# Patient Record
Sex: FEMALE | Race: WHITE | NOT HISPANIC OR LATINO | Employment: OTHER | URBAN - NONMETROPOLITAN AREA
[De-identification: names, ages, dates, MRNs, and addresses within clinical notes are randomized per-mention and may not be internally consistent; named-entity substitution may affect disease eponyms.]

---

## 2017-02-14 ENCOUNTER — APPOINTMENT (OUTPATIENT)
Dept: PHYSICAL THERAPY | Facility: CLINIC | Age: 67
End: 2017-02-14
Payer: MEDICARE

## 2017-02-14 PROCEDURE — G8988 SELF CARE GOAL STATUS: HCPCS

## 2017-02-14 PROCEDURE — G8987 SELF CARE CURRENT STATUS: HCPCS

## 2017-02-14 PROCEDURE — 97014 ELECTRIC STIMULATION THERAPY: CPT

## 2017-02-14 PROCEDURE — 97035 APP MDLTY 1+ULTRASOUND EA 15: CPT

## 2017-02-14 PROCEDURE — 97140 MANUAL THERAPY 1/> REGIONS: CPT

## 2017-02-14 PROCEDURE — 97760 ORTHOTIC MGMT&TRAING 1ST ENC: CPT

## 2017-02-14 PROCEDURE — 97162 PT EVAL MOD COMPLEX 30 MIN: CPT

## 2017-02-15 ENCOUNTER — APPOINTMENT (OUTPATIENT)
Dept: PHYSICAL THERAPY | Facility: CLINIC | Age: 67
End: 2017-02-15
Payer: MEDICARE

## 2017-02-16 ENCOUNTER — APPOINTMENT (OUTPATIENT)
Dept: PHYSICAL THERAPY | Facility: CLINIC | Age: 67
End: 2017-02-16
Payer: MEDICARE

## 2017-02-16 PROCEDURE — 97035 APP MDLTY 1+ULTRASOUND EA 15: CPT

## 2017-02-16 PROCEDURE — 97140 MANUAL THERAPY 1/> REGIONS: CPT

## 2017-02-16 PROCEDURE — 97014 ELECTRIC STIMULATION THERAPY: CPT

## 2017-02-16 PROCEDURE — 97110 THERAPEUTIC EXERCISES: CPT

## 2017-02-16 PROCEDURE — 97760 ORTHOTIC MGMT&TRAING 1ST ENC: CPT

## 2017-02-20 ENCOUNTER — APPOINTMENT (OUTPATIENT)
Dept: PHYSICAL THERAPY | Facility: CLINIC | Age: 67
End: 2017-02-20
Payer: MEDICARE

## 2017-02-20 PROCEDURE — 97140 MANUAL THERAPY 1/> REGIONS: CPT

## 2017-02-20 PROCEDURE — 97014 ELECTRIC STIMULATION THERAPY: CPT

## 2017-02-20 PROCEDURE — 97035 APP MDLTY 1+ULTRASOUND EA 15: CPT

## 2017-02-20 PROCEDURE — 97110 THERAPEUTIC EXERCISES: CPT

## 2017-02-21 ENCOUNTER — APPOINTMENT (OUTPATIENT)
Dept: PHYSICAL THERAPY | Facility: CLINIC | Age: 67
End: 2017-02-21
Payer: MEDICARE

## 2017-02-24 ENCOUNTER — APPOINTMENT (OUTPATIENT)
Dept: PHYSICAL THERAPY | Facility: CLINIC | Age: 67
End: 2017-02-24
Payer: MEDICARE

## 2017-02-24 PROCEDURE — 97110 THERAPEUTIC EXERCISES: CPT

## 2017-02-24 PROCEDURE — 97014 ELECTRIC STIMULATION THERAPY: CPT

## 2017-02-24 PROCEDURE — 97140 MANUAL THERAPY 1/> REGIONS: CPT

## 2017-02-24 PROCEDURE — 97035 APP MDLTY 1+ULTRASOUND EA 15: CPT

## 2017-02-27 ENCOUNTER — APPOINTMENT (OUTPATIENT)
Dept: PHYSICAL THERAPY | Facility: CLINIC | Age: 67
End: 2017-02-27
Payer: MEDICARE

## 2017-02-27 PROCEDURE — 97110 THERAPEUTIC EXERCISES: CPT

## 2017-02-27 PROCEDURE — 97140 MANUAL THERAPY 1/> REGIONS: CPT

## 2017-02-27 PROCEDURE — 97014 ELECTRIC STIMULATION THERAPY: CPT

## 2017-02-27 PROCEDURE — 97035 APP MDLTY 1+ULTRASOUND EA 15: CPT

## 2017-03-02 ENCOUNTER — APPOINTMENT (OUTPATIENT)
Dept: PHYSICAL THERAPY | Facility: CLINIC | Age: 67
End: 2017-03-02
Payer: MEDICARE

## 2017-03-02 PROCEDURE — 97110 THERAPEUTIC EXERCISES: CPT

## 2017-03-02 PROCEDURE — 97035 APP MDLTY 1+ULTRASOUND EA 15: CPT

## 2017-03-02 PROCEDURE — 97140 MANUAL THERAPY 1/> REGIONS: CPT

## 2017-03-02 PROCEDURE — 97014 ELECTRIC STIMULATION THERAPY: CPT

## 2017-03-06 ENCOUNTER — APPOINTMENT (OUTPATIENT)
Dept: PHYSICAL THERAPY | Facility: CLINIC | Age: 67
End: 2017-03-06
Payer: MEDICARE

## 2017-03-06 PROCEDURE — 97035 APP MDLTY 1+ULTRASOUND EA 15: CPT

## 2017-03-06 PROCEDURE — 97110 THERAPEUTIC EXERCISES: CPT

## 2017-03-06 PROCEDURE — 97140 MANUAL THERAPY 1/> REGIONS: CPT

## 2017-03-06 PROCEDURE — 97014 ELECTRIC STIMULATION THERAPY: CPT

## 2017-03-09 ENCOUNTER — APPOINTMENT (OUTPATIENT)
Dept: PHYSICAL THERAPY | Facility: CLINIC | Age: 67
End: 2017-03-09
Payer: MEDICARE

## 2017-03-09 PROCEDURE — 97014 ELECTRIC STIMULATION THERAPY: CPT

## 2017-03-09 PROCEDURE — 97140 MANUAL THERAPY 1/> REGIONS: CPT

## 2017-03-09 PROCEDURE — 97035 APP MDLTY 1+ULTRASOUND EA 15: CPT

## 2017-03-09 PROCEDURE — 97110 THERAPEUTIC EXERCISES: CPT

## 2017-03-13 ENCOUNTER — APPOINTMENT (OUTPATIENT)
Dept: PHYSICAL THERAPY | Facility: CLINIC | Age: 67
End: 2017-03-13
Payer: MEDICARE

## 2017-03-13 PROCEDURE — 97035 APP MDLTY 1+ULTRASOUND EA 15: CPT

## 2017-03-13 PROCEDURE — 97014 ELECTRIC STIMULATION THERAPY: CPT

## 2017-03-13 PROCEDURE — 97140 MANUAL THERAPY 1/> REGIONS: CPT

## 2017-03-13 PROCEDURE — 97110 THERAPEUTIC EXERCISES: CPT

## 2017-03-16 ENCOUNTER — APPOINTMENT (OUTPATIENT)
Dept: PHYSICAL THERAPY | Facility: CLINIC | Age: 67
End: 2017-03-16
Payer: MEDICARE

## 2017-03-16 PROCEDURE — G8987 SELF CARE CURRENT STATUS: HCPCS

## 2017-03-16 PROCEDURE — 97140 MANUAL THERAPY 1/> REGIONS: CPT

## 2017-03-16 PROCEDURE — G8988 SELF CARE GOAL STATUS: HCPCS

## 2017-03-16 PROCEDURE — 97110 THERAPEUTIC EXERCISES: CPT

## 2017-03-16 PROCEDURE — 97035 APP MDLTY 1+ULTRASOUND EA 15: CPT

## 2017-03-16 PROCEDURE — 97014 ELECTRIC STIMULATION THERAPY: CPT

## 2017-03-20 ENCOUNTER — APPOINTMENT (OUTPATIENT)
Dept: PHYSICAL THERAPY | Facility: CLINIC | Age: 67
End: 2017-03-20
Payer: MEDICARE

## 2017-03-20 PROCEDURE — 97140 MANUAL THERAPY 1/> REGIONS: CPT

## 2017-03-20 PROCEDURE — 97014 ELECTRIC STIMULATION THERAPY: CPT

## 2017-03-20 PROCEDURE — 97035 APP MDLTY 1+ULTRASOUND EA 15: CPT

## 2017-03-20 PROCEDURE — 97110 THERAPEUTIC EXERCISES: CPT

## 2017-03-23 ENCOUNTER — APPOINTMENT (OUTPATIENT)
Dept: PHYSICAL THERAPY | Facility: CLINIC | Age: 67
End: 2017-03-23
Payer: MEDICARE

## 2017-03-23 PROCEDURE — 97110 THERAPEUTIC EXERCISES: CPT

## 2017-03-23 PROCEDURE — 97035 APP MDLTY 1+ULTRASOUND EA 15: CPT

## 2017-03-23 PROCEDURE — 97014 ELECTRIC STIMULATION THERAPY: CPT

## 2017-03-23 PROCEDURE — 97140 MANUAL THERAPY 1/> REGIONS: CPT

## 2017-03-27 ENCOUNTER — APPOINTMENT (OUTPATIENT)
Dept: PHYSICAL THERAPY | Facility: CLINIC | Age: 67
End: 2017-03-27
Payer: MEDICARE

## 2017-03-27 PROCEDURE — 97035 APP MDLTY 1+ULTRASOUND EA 15: CPT

## 2017-03-27 PROCEDURE — 97140 MANUAL THERAPY 1/> REGIONS: CPT

## 2017-03-27 PROCEDURE — 97014 ELECTRIC STIMULATION THERAPY: CPT

## 2017-03-27 PROCEDURE — 97110 THERAPEUTIC EXERCISES: CPT

## 2017-03-30 ENCOUNTER — APPOINTMENT (OUTPATIENT)
Dept: PHYSICAL THERAPY | Facility: CLINIC | Age: 67
End: 2017-03-30
Payer: MEDICARE

## 2017-03-30 PROCEDURE — 97140 MANUAL THERAPY 1/> REGIONS: CPT

## 2017-03-30 PROCEDURE — 97035 APP MDLTY 1+ULTRASOUND EA 15: CPT

## 2017-03-30 PROCEDURE — 97110 THERAPEUTIC EXERCISES: CPT

## 2017-03-30 PROCEDURE — 97014 ELECTRIC STIMULATION THERAPY: CPT

## 2017-04-03 ENCOUNTER — APPOINTMENT (OUTPATIENT)
Dept: PHYSICAL THERAPY | Facility: CLINIC | Age: 67
End: 2017-04-03
Payer: MEDICARE

## 2017-04-03 PROCEDURE — 97035 APP MDLTY 1+ULTRASOUND EA 15: CPT

## 2017-04-03 PROCEDURE — 97014 ELECTRIC STIMULATION THERAPY: CPT

## 2017-04-03 PROCEDURE — 97140 MANUAL THERAPY 1/> REGIONS: CPT

## 2017-04-03 PROCEDURE — 97110 THERAPEUTIC EXERCISES: CPT

## 2017-04-06 ENCOUNTER — APPOINTMENT (OUTPATIENT)
Dept: PHYSICAL THERAPY | Facility: CLINIC | Age: 67
End: 2017-04-06
Payer: MEDICARE

## 2017-04-06 PROCEDURE — 97035 APP MDLTY 1+ULTRASOUND EA 15: CPT

## 2017-04-06 PROCEDURE — 97110 THERAPEUTIC EXERCISES: CPT

## 2017-04-06 PROCEDURE — 97014 ELECTRIC STIMULATION THERAPY: CPT

## 2017-04-06 PROCEDURE — 97140 MANUAL THERAPY 1/> REGIONS: CPT

## 2017-04-10 ENCOUNTER — APPOINTMENT (OUTPATIENT)
Dept: PHYSICAL THERAPY | Facility: CLINIC | Age: 67
End: 2017-04-10
Payer: MEDICARE

## 2017-04-10 PROCEDURE — 97140 MANUAL THERAPY 1/> REGIONS: CPT

## 2017-04-10 PROCEDURE — 97035 APP MDLTY 1+ULTRASOUND EA 15: CPT

## 2017-04-10 PROCEDURE — 97014 ELECTRIC STIMULATION THERAPY: CPT

## 2017-04-10 PROCEDURE — 97110 THERAPEUTIC EXERCISES: CPT

## 2017-04-13 ENCOUNTER — APPOINTMENT (OUTPATIENT)
Dept: PHYSICAL THERAPY | Facility: CLINIC | Age: 67
End: 2017-04-13
Payer: MEDICARE

## 2017-04-13 PROCEDURE — 97035 APP MDLTY 1+ULTRASOUND EA 15: CPT

## 2017-04-13 PROCEDURE — 97014 ELECTRIC STIMULATION THERAPY: CPT

## 2017-04-13 PROCEDURE — 97140 MANUAL THERAPY 1/> REGIONS: CPT

## 2017-04-13 PROCEDURE — 97110 THERAPEUTIC EXERCISES: CPT

## 2017-04-17 ENCOUNTER — APPOINTMENT (OUTPATIENT)
Dept: PHYSICAL THERAPY | Facility: CLINIC | Age: 67
End: 2017-04-17
Payer: MEDICARE

## 2017-04-17 PROCEDURE — 97110 THERAPEUTIC EXERCISES: CPT

## 2017-04-17 PROCEDURE — 97140 MANUAL THERAPY 1/> REGIONS: CPT

## 2017-04-17 PROCEDURE — 97014 ELECTRIC STIMULATION THERAPY: CPT

## 2017-04-17 PROCEDURE — 97035 APP MDLTY 1+ULTRASOUND EA 15: CPT

## 2017-04-20 ENCOUNTER — APPOINTMENT (OUTPATIENT)
Dept: PHYSICAL THERAPY | Facility: CLINIC | Age: 67
End: 2017-04-20
Payer: MEDICARE

## 2017-04-20 PROCEDURE — 97014 ELECTRIC STIMULATION THERAPY: CPT

## 2017-04-20 PROCEDURE — 97110 THERAPEUTIC EXERCISES: CPT

## 2017-04-20 PROCEDURE — 97140 MANUAL THERAPY 1/> REGIONS: CPT

## 2017-04-20 PROCEDURE — 97035 APP MDLTY 1+ULTRASOUND EA 15: CPT

## 2017-04-24 ENCOUNTER — APPOINTMENT (OUTPATIENT)
Dept: PHYSICAL THERAPY | Facility: CLINIC | Age: 67
End: 2017-04-24
Payer: MEDICARE

## 2017-04-24 PROCEDURE — 97014 ELECTRIC STIMULATION THERAPY: CPT

## 2017-04-24 PROCEDURE — 97035 APP MDLTY 1+ULTRASOUND EA 15: CPT

## 2017-04-24 PROCEDURE — 97140 MANUAL THERAPY 1/> REGIONS: CPT

## 2017-04-24 PROCEDURE — 97110 THERAPEUTIC EXERCISES: CPT

## 2017-04-27 ENCOUNTER — APPOINTMENT (OUTPATIENT)
Dept: PHYSICAL THERAPY | Facility: CLINIC | Age: 67
End: 2017-04-27
Payer: MEDICARE

## 2017-04-28 ENCOUNTER — APPOINTMENT (OUTPATIENT)
Dept: PHYSICAL THERAPY | Facility: CLINIC | Age: 67
End: 2017-04-28
Payer: MEDICARE

## 2017-04-28 PROCEDURE — 97140 MANUAL THERAPY 1/> REGIONS: CPT

## 2017-04-28 PROCEDURE — G8988 SELF CARE GOAL STATUS: HCPCS

## 2017-04-28 PROCEDURE — G8989 SELF CARE D/C STATUS: HCPCS

## 2017-04-28 PROCEDURE — 97014 ELECTRIC STIMULATION THERAPY: CPT

## 2017-04-28 PROCEDURE — 97035 APP MDLTY 1+ULTRASOUND EA 15: CPT

## 2017-04-28 PROCEDURE — 97110 THERAPEUTIC EXERCISES: CPT

## 2019-10-23 ENCOUNTER — EVALUATION (OUTPATIENT)
Dept: PHYSICAL THERAPY | Facility: CLINIC | Age: 69
End: 2019-10-23
Payer: MEDICARE

## 2019-10-23 ENCOUNTER — TRANSCRIBE ORDERS (OUTPATIENT)
Dept: PHYSICAL THERAPY | Facility: CLINIC | Age: 69
End: 2019-10-23

## 2019-10-23 DIAGNOSIS — M25.511 ACUTE PAIN OF RIGHT SHOULDER: Primary | ICD-10-CM

## 2019-10-23 PROCEDURE — 97162 PT EVAL MOD COMPLEX 30 MIN: CPT | Performed by: PHYSICAL THERAPIST

## 2019-10-23 PROCEDURE — 97535 SELF CARE MNGMENT TRAINING: CPT | Performed by: PHYSICAL THERAPIST

## 2019-10-23 NOTE — LETTER
2019    Martha Castillo PA-C  [address]    Patient: Alejandro Weinstein   YOB: 1950   Date of Visit: 10/23/2019     Encounter Diagnosis     ICD-10-CM    1  Acute pain of right shoulder M25 511        Dear Dr Jaz Bal: Thank you for your recent referral of Alejandro Weinstein  Please review the attached evaluation summary from Annalee's recent visit  Please verify that you agree with the plan of care by signing the attached order  If you have any questions or concerns, please do not hesitate to call  I sincerely appreciate the opportunity to share in the care of one of your patients and hope to have another opportunity to work with you in the near future  Sincerely,    Tony Richardson, PT      Referring Provider:      I certify that I have read the below Plan of Care and certify the need for these services furnished under this plan of treatment while under my care  Martha Castillo PA-C  [id number]  [address] [place] [address]  [address]: [phone]          PT Evaluation     Today's date: 10/23/2019  Patient name: Alejandro Weinstein  : 1950  MRN: [id number]  Referring provider: Bryce Alaniz PA-C  Dx:   Encounter Diagnosis     ICD-10-CM    1  Acute pain of right shoulder M25 511        Start Time: 1030  Stop Time: 1130  Total time in clinic (min): 60 minutes    Assessment  Assessment details: Pt presents with c/c of right shoulder pain and dysfunction  PT notes the patient with s/s consistent with RTC and biceps impairment, likely partial tear, leading to decreased mm strength t/o the right shoulder, decreased active and passive ROM, and increased pain with RUE movement  Pt would benefit from course of skilled therapy to improve strength and mobility of the right shoulder, improve scapular stabilization, decrease pain, and restore functional mobility of the RUE   Prognosis is good with adherence to skilled PT 2-3x/wk and compliance to HEP  Based upon examination, no other referral appears necessary at this time  Impairments: abnormal or restricted ROM, activity intolerance, impaired physical strength, lacks appropriate home exercise program, pain with function, scapular dyskinesis and poor posture   Understanding of Dx/Px/POC: good   Prognosis: good    Goals  Short Term Goals  1  Pt will decrease pain in right shoulder 25-50% in 4 wks  2  Pt will improve right shoulder ROM to [facility] in 4  wks  3  Initiate HEP     Long Term Goals  1  Pt will decrease pain in right shoulder % in 8 wks  2  Pt will improve right shoulder mm strength to 5/5 in 8 wks  3  Pt will report no limitations with ADLs in 8 wks     Plan  Plan details: Discussed findings of evaluation with the patient, patient agreeable to skilled PT 2-3x/wk for 4 wks  POC and treatment goals discussed with PT/PTA  Patient would benefit from: PT eval and skilled physical therapy  Referral necessary: No  Planned modality interventions: cryotherapy, unattended electrical stimulation and thermotherapy: hydrocollator packs  Planned therapy interventions: abdominal trunk stabilization, flexibility, functional ROM exercises, graded exercise, home exercise program, joint mobilization, manual therapy, massage, neuromuscular re-education, patient education, postural training, strengthening, stretching and therapeutic exercise  Frequency: 3x week  Duration in weeks: 8  Plan of Care beginning date: 10/23/2019  Plan of Care expiration date: 11/22/2019  Treatment plan discussed with: patient and PTA        Subjective Evaluation    History of Present Illness  Mechanism of injury: Pt presents with c/c of right shoulder pain  Reports symptoms began in August with insidious onset, noting pain when lying on her right side when sleeping  Pt reports in September she was lifting a box and heard a "pop" in her upper arm, and pain has been more constant since then   Pt went to see MD, had injection, and was referred to OPPT  Had x-ray which was negative for osseous injury  No other treatment or imaging to date, no previous injury per pt  Scheduled to f/u with MD on   Pain  Current pain ratin  At best pain ratin  At worst pain ratin  Location: Right Shoulder   Quality: dull ache and sharp  Relieving factors: heat  Aggravating factors: lifting and overhead activity  Progression: no change    Hand dominance: right      Diagnostic Tests  X-ray: abnormal  Treatments  Previous treatment: injection treatment  Current treatment: physical therapy  Patient Goals  Patient goals for therapy: decreased pain, increased motion, increased strength, independence with ADLs/IADLs and return to sport/leisure activities          Objective     Palpation     Right   Tenderness of the anterior deltoid, biceps, teres major and teres minor  Tenderness     Right Shoulder  Tenderness in the acromion, biceps tendon (proximal) and bicipital groove  Cervical/Thoracic Screen   Cervical range of motion within normal limits    Neurological Testing     Sensation     Shoulder   Left Shoulder   Intact: light touch    Right Shoulder   Intact: light touch    Active Range of Motion   Left Shoulder   External rotation BTH: C7   Internal rotation BTB: mid thoracic     Right Shoulder   Flexion: 135 degrees   Extension: WFL  Abduction: 110 degrees with pain  External rotation 45°:  40 degrees with pain  External rotation BTH: C3   Internal rotation 45°:  55 degrees   Internal rotation BTB: sacrum with pain    Strength/Myotome Testing     Left Shoulder   Normal muscle strength    Right Shoulder     Planes of Motion   Flexion: 4   Extension: 4+   Abduction: 3+   Adduction: 4-   External rotation at 0°:  3+   Internal rotation at 0°:  4-     Isolated Muscles   Biceps: 4   Triceps: 4+   Upper trapezius: 4+     Tests     Right Shoulder   Positive empty can, lift-off, painful arc, Speed's, Yergason's and bicep load   Negative apprehension, clunk, drop arm, external rotation lag sign, full can, internal rotation lag sign, sulcus sign and posterior apprehension        Additional Tests Details  (+) RTC and biceps impairment of right shoulder, likely partial tear              Precautions: Right Shoulder Pain (Possible RTC/biceps partial tear)      Manual  10/23       Right Shoulder Streching & PROM         Right [facility] Joint Mobs                     Exercise Diary  10/23       UBE        TB MTP/LTP        TB ER/IR         S/L ER        S/L Abd         S/L Fwd Flex         Prone Y         Prone Row         Prone Horz Abd        Prone Ext         Supine Press and Flex         Wand AAROM        Finger Ladder         Shoulder Isometrics         Shoulder AROM                                HEP Review  Performed            Modalities  10/23       MHP/CP

## 2019-10-23 NOTE — PROGRESS NOTES
PT Evaluation     Today's date: 10/23/2019  Patient name: Tiffanie Fuchs  : 1950  MRN: [id number]  Referring provider: Justine Varner PA-C  Dx:   Encounter Diagnosis     ICD-10-CM    1  Acute pain of right shoulder M25 511        Start Time: 1030  Stop Time: 1130  Total time in clinic (min): 60 minutes    Assessment  Assessment details: Pt presents with c/c of right shoulder pain and dysfunction  PT notes the patient with s/s consistent with RTC and biceps impairment, likely partial tear, leading to decreased mm strength t/o the right shoulder, decreased active and passive ROM, and increased pain with RUE movement  Pt would benefit from course of skilled therapy to improve strength and mobility of the right shoulder, improve scapular stabilization, decrease pain, and restore functional mobility of the RUE  Prognosis is good with adherence to skilled PT 2-3x/wk and compliance to HEP  Based upon examination, no other referral appears necessary at this time  Impairments: abnormal or restricted ROM, activity intolerance, impaired physical strength, lacks appropriate home exercise program, pain with function, scapular dyskinesis and poor posture   Understanding of Dx/Px/POC: good   Prognosis: good    Goals  Short Term Goals  1  Pt will decrease pain in right shoulder 25-50% in 4 wks  2  Pt will improve right shoulder ROM to [facility] in 4  wks  3  Initiate HEP     Long Term Goals  1  Pt will decrease pain in right shoulder % in 8 wks  2  Pt will improve right shoulder mm strength to 5/5 in 8 wks  3  Pt will report no limitations with ADLs in 8 wks     Plan  Plan details: Discussed findings of evaluation with the patient, patient agreeable to skilled PT 2-3x/wk for 4 wks  POC and treatment goals discussed with PT/PTA     Patient would benefit from: PT eval and skilled physical therapy  Referral necessary: No  Planned modality interventions: cryotherapy, unattended electrical stimulation and thermotherapy: hydrocollator packs  Planned therapy interventions: abdominal trunk stabilization, flexibility, functional ROM exercises, graded exercise, home exercise program, joint mobilization, manual therapy, massage, neuromuscular re-education, patient education, postural training, strengthening, stretching and therapeutic exercise  Frequency: 3x week  Duration in weeks: 8  Plan of Care beginning date: 10/23/2019  Plan of Care expiration date: 2019  Treatment plan discussed with: patient and PTA        Subjective Evaluation    History of Present Illness  Mechanism of injury: Pt presents with c/c of right shoulder pain  Reports symptoms began in August with insidious onset, noting pain when lying on her right side when sleeping  Pt reports in September she was lifting a box and heard a "pop" in her upper arm, and pain has been more constant since then  Pt went to see MD, had injection, and was referred to OPPT  Had x-ray which was negative for osseous injury  No other treatment or imaging to date, no previous injury per pt  Scheduled to f/u with MD on   Pain  Current pain ratin  At best pain ratin  At worst pain ratin  Location: Right Shoulder   Quality: dull ache and sharp  Relieving factors: heat  Aggravating factors: lifting and overhead activity  Progression: no change    Hand dominance: right      Diagnostic Tests  X-ray: abnormal  Treatments  Previous treatment: injection treatment  Current treatment: physical therapy  Patient Goals  Patient goals for therapy: decreased pain, increased motion, increased strength, independence with ADLs/IADLs and return to sport/leisure activities          Objective     Palpation     Right   Tenderness of the anterior deltoid, biceps, teres major and teres minor  Tenderness     Right Shoulder  Tenderness in the acromion, biceps tendon (proximal) and bicipital groove       Cervical/Thoracic Screen   Cervical range of motion within normal limits    Neurological Testing     Sensation     Shoulder   Left Shoulder   Intact: light touch    Right Shoulder   Intact: light touch    Active Range of Motion   Left Shoulder   External rotation BTH: C7   Internal rotation BTB: mid thoracic     Right Shoulder   Flexion: 135 degrees   Extension: WFL  Abduction: 110 degrees with pain  External rotation 45°: 40 degrees with pain  External rotation BTH: C3   Internal rotation 45°: 55 degrees   Internal rotation BTB: sacrum with pain    Strength/Myotome Testing     Left Shoulder   Normal muscle strength    Right Shoulder     Planes of Motion   Flexion: 4   Extension: 4+   Abduction: 3+   Adduction: 4-   External rotation at 0°: 3+   Internal rotation at 0°: 4-     Isolated Muscles   Biceps: 4   Triceps: 4+   Upper trapezius: 4+     Tests     Right Shoulder   Positive empty can, lift-off, painful arc, Speed's, Yergason's and bicep load   Negative apprehension, clunk, drop arm, external rotation lag sign, full can, internal rotation lag sign, sulcus sign and posterior apprehension        Additional Tests Details  (+) RTC and biceps impairment of right shoulder, likely partial tear              Precautions: Right Shoulder Pain (Possible RTC/biceps partial tear)      Manual  10/23       Right Shoulder Streching & PROM         Right GH Joint Mobs                     Exercise Diary  10/23       UBE        TB MTP/LTP        TB ER/IR         S/L ER        S/L Abd         S/L Fwd Flex         Prone Y         Prone Row         Prone Horz Abd        Prone Ext         Supine Press and Flex         Wand AAROM        Finger Ladder         Shoulder Isometrics         Shoulder AROM                                HEP Review  Performed            Modalities  10/23       MHP/CP

## 2019-10-25 ENCOUNTER — OFFICE VISIT (OUTPATIENT)
Dept: PHYSICAL THERAPY | Facility: CLINIC | Age: 69
End: 2019-10-25
Payer: MEDICARE

## 2019-10-25 DIAGNOSIS — M25.511 ACUTE PAIN OF RIGHT SHOULDER: Primary | ICD-10-CM

## 2019-10-25 PROCEDURE — 97010 HOT OR COLD PACKS THERAPY: CPT

## 2019-10-25 PROCEDURE — 97110 THERAPEUTIC EXERCISES: CPT

## 2019-10-25 NOTE — PROGRESS NOTES
Daily Note     Today's date: 10/25/2019  Patient name: Kaela Mason  : 1950  MRN: [id number]  Referring provider: Nora Whiting PA-C  Dx:   Encounter Diagnosis     ICD-10-CM    1  Acute pain of right shoulder M25 511        Start Time: 1100  Stop Time: 1140  Total time in clinic (min): 40 minutes    Subjective: Patient responded well to initial evaluation  Soreness noted in shoulder today  Objective: PTA directed patient in conservative  UE program this session to avoid increase in sx, See treatment diary below  Assessment: Tolerated treatment well  Patient demonstrated fatigue post treatment and exhibited good technique with therapeutic exercises  Discomfort noted with ER and flexion of R shoulder  Plan: Continue per plan of care        Precautions: Right Shoulder Pain (Possible RTC/biceps partial tear)      Manual  10/23 10/25      Right Shoulder Streching & PROM         Right [address] Joint Mobs                     Exercise Diary  10/23 10/25      UBE  4m L1      TB MTP/LTP        TB ER/IR         S/L ER  2/10      S/L Abd         S/L Fwd Flex         Prone Y   10x       Prone Row   10x       Prone Horz Abd        Prone Ext   10x       Supine Press and Flex   Press 2/10      Wand AAROM  Supine flex 3/5      Finger Ladder         Shoulder Isometrics   HEP      Shoulder AROM  Seated    Fl 90                               HEP Review  Performed            Modalities  10/23 10/25      MHP/CP  10 CP

## 2019-10-28 ENCOUNTER — OFFICE VISIT (OUTPATIENT)
Dept: PHYSICAL THERAPY | Facility: CLINIC | Age: 69
End: 2019-10-28
Payer: MEDICARE

## 2019-10-28 DIAGNOSIS — M25.511 ACUTE PAIN OF RIGHT SHOULDER: Primary | ICD-10-CM

## 2019-10-28 PROCEDURE — 97110 THERAPEUTIC EXERCISES: CPT

## 2019-10-28 PROCEDURE — 97010 HOT OR COLD PACKS THERAPY: CPT

## 2019-10-28 PROCEDURE — 97014 ELECTRIC STIMULATION THERAPY: CPT

## 2019-10-28 NOTE — PROGRESS NOTES
Daily Note     Today's date: 10/28/2019  Patient name: Graeme Chavez  : 1950  MRN: [id number]  Referring provider: Simone Gamboa PA-C  Dx:   Encounter Diagnosis     ICD-10-CM    1  Acute pain of right shoulder M25 511                   Subjective: Pt states she continues with R shoulder discomfort; MD F/U with Dr Praveen Willis 2019 unless she feels the need to see him sooner  Objective: See treatment diary below  Added HP/IFC pre-exercise (per PT POC) CPx10m post-tx  Assessment: Tolerated treatment fair  Patient demonstrated fatigue post treatment      Plan: Continue per plan of care        Precautions: Right Shoulder Pain (Possible RTC/biceps partial tear)      Manual  10/23 10/25 10/28     Right Shoulder Streching & PROM    10 IR/ER only  prom     Right [address] Joint Mobs                     Exercise Diary  10/23 10/25 10/28     UBE  4m L1 4m L1     TB MTP/LTP        TB ER/IR         S/L ER  2/10 2/10     S/L Abd         S/L Fwd Flex         Prone Y   10x  10x palm down     Prone Row   10x  10x palm down     Prone Horz Abd        Prone Ext   10x  10x palm down     Supine Press and Flex   Press 2/10 Press/flex 2/10     Wand AAROM  Supine flex 3/5 ------->     Finger Ladder         Shoulder Isometrics   HEP home     Shoulder AROM  Seated    Fl 90  Seated flexion with eccentric 10x AAROM                             HEP Review  Performed            Modalities  10/23 10/25 10/28     MHP/CP  10m 10     HP/IFC  ----> 15m

## 2019-11-01 ENCOUNTER — OFFICE VISIT (OUTPATIENT)
Dept: PHYSICAL THERAPY | Facility: CLINIC | Age: 69
End: 2019-11-01
Payer: MEDICARE

## 2019-11-01 DIAGNOSIS — M25.511 ACUTE PAIN OF RIGHT SHOULDER: Primary | ICD-10-CM

## 2019-11-01 PROCEDURE — 97014 ELECTRIC STIMULATION THERAPY: CPT

## 2019-11-01 PROCEDURE — 97010 HOT OR COLD PACKS THERAPY: CPT

## 2019-11-01 PROCEDURE — 97110 THERAPEUTIC EXERCISES: CPT

## 2019-11-01 NOTE — PROGRESS NOTES
Daily Note     Today's date: 2019  Patient name: Ketty Kan  : 1950  MRN: [id number]  Referring provider: Zoë Eduardo PA-C  Dx:   Encounter Diagnosis     ICD-10-CM    1  Acute pain of right shoulder M25 511        Start Time: 830  Stop Time: 935  Total time in clinic (min): 65 minutes    Subjective: Pt continues to note limited motion of her R shoulder due to pain  Objective: See treatment diary below  Added US per PT POC  Provided pt with handouts for her HEP  Added prone row also  Assessment: Tolerated treatment well  Patient exhibited good technique with therapeutic exercises      Plan: Continue per plan of care        Precautions: Right Shoulder Pain (Possible RTC/biceps partial tear)      Manual  10/23 10/25 10/28 11/1    Right Shoulder Streching & PROM    10 IR/ER only  prom 10 ir/er prom    Right [address] Joint Mobs                     Exercise Diary  10/23 10/25 10/28 11/1    UBE  4m L1 4m L1 5m L2    TB MTP/LTP        TB ER/IR         S/L ER  2/10 2/10 2/10    S/L Abd         S/L Fwd Flex         Prone Y   10x  10x palm down 10x    Prone Row   10x  10x palm down 10x    Prone Horz Abd   10x palm down 10x    Prone Ext   10x  10x palm down 10x    Supine Press and Flex   Press 2/10 Press/flex 2/10 2/10    Wand AAROM  Supine flex 3/5 -------> 1/10    Finger Ladder         Shoulder Isometrics   HEP home     Shoulder AROM  Seated    Fl 90  Seated flexion with eccentric 10x AAROM 10x I                            HEP Review  Performed            Modalities  10/23 10/25 10/28 11/1    MHP/CP  10m 10     HP/IFC  ----> 15m 15m    US    10m

## 2019-11-04 ENCOUNTER — OFFICE VISIT (OUTPATIENT)
Dept: PHYSICAL THERAPY | Facility: CLINIC | Age: 69
End: 2019-11-04
Payer: MEDICARE

## 2019-11-04 DIAGNOSIS — M25.511 ACUTE PAIN OF RIGHT SHOULDER: Primary | ICD-10-CM

## 2019-11-04 PROCEDURE — 97014 ELECTRIC STIMULATION THERAPY: CPT

## 2019-11-04 PROCEDURE — 97110 THERAPEUTIC EXERCISES: CPT

## 2019-11-04 PROCEDURE — 97010 HOT OR COLD PACKS THERAPY: CPT

## 2019-11-04 NOTE — PROGRESS NOTES
Daily Note     Today's date: 2019  Patient name: Ceferino Leonard  : 1950  MRN: [id number]  Referring provider: Avi Sanches PA-C  Dx:   Encounter Diagnosis     ICD-10-CM    1  Acute pain of right shoulder M25 511        Start Time: 930  Stop Time: 1035  Total time in clinic (min): 65 minutes    Subjective: Pt reports persisting  R shoulder discomfort  Slightly less after tx last visit  Objective: See treatment diary below      Assessment: Tolerated treatment well  Patient exhibited good technique with therapeutic exercises      Plan: Continue per plan of care        Precautions: Right Shoulder Pain (Possible RTC/biceps partial tear)      Manual  10/23 10/25 10/28 11/1 11/4   Right Shoulder Streching & PROM    10 IR/ER only  prom 10 ir/er prom 10 ir/er prom   Right [facility] Joint Mobs                     Exercise Diary  10/23 10/25 10/28 11/1 11/4   UBE  4m L1 4m L1 5m L2 5m L2   TB MTP/LTP        TB ER/IR         S/L ER  2/10 2/10 2/10 2/10   S/L Abd         S/L Fwd Flex         Prone Y   10x  10x palm down 10x 10x   Prone Row   10x  10x palm down 10x 10x   Prone Horz Abd   10x palm down 10x 10x   Prone Ext   10x  10x palm down 10x 10x   Supine Press and Flex   Press 2/10 Press/flex 2/10 2/10 210   Wand AAROM  Supine flex 3/5 -------> 1/10 xxx   Finger Ladder         Shoulder Isometrics   HEP home     Shoulder AROM  Seated    Fl 90  Seated flexion with eccentric 10x AAROM 10x  2/10 active                           HEP Review  Performed            Modalities  10/23 10/25 10/28 11/1 11/4   MHP/CP  10m 10     HP/IFC  ----> 15m 15m 15m   US    10m 10m

## 2019-11-07 ENCOUNTER — OFFICE VISIT (OUTPATIENT)
Dept: PHYSICAL THERAPY | Facility: CLINIC | Age: 69
End: 2019-11-07
Payer: MEDICARE

## 2019-11-07 DIAGNOSIS — M25.511 ACUTE PAIN OF RIGHT SHOULDER: Primary | ICD-10-CM

## 2019-11-07 PROCEDURE — 97110 THERAPEUTIC EXERCISES: CPT

## 2019-11-07 PROCEDURE — 97010 HOT OR COLD PACKS THERAPY: CPT

## 2019-11-07 PROCEDURE — 97035 APP MDLTY 1+ULTRASOUND EA 15: CPT

## 2019-11-07 NOTE — PROGRESS NOTES
Daily Note     Today's date: 2019  Patient name: Wilfredo Mccarty  : 1950  MRN: [id number]  Referring provider: Emily Mejia PA-C  Dx:   Encounter Diagnosis     ICD-10-CM    1  Acute pain of right shoulder M25 511        Start Time: 0900  Stop Time: 0955  Total time in clinic (min): 55 minutes    Subjective: Pt states she got her arm caught in her coat and experienced increased R shoulder discomfort; this morning  Objective: See treatment diary below  Pt with moderate anterior shoulder pain   Assessment: Tolerated treatment fair  Patient exhibited good technique with therapeutic exercises  She was able to complete her program despite discomfort  Plan: Continue per plan of care        Precautions: Right Shoulder Pain (Possible RTC/biceps partial tear)      Manual  11/7 10/25 10/28 11/1 11/4   Right Shoulder Streching & PROM  -------->  10 IR/ER only  prom 10 ir/er prom 10 ir/er prom   Right [address] Joint Mobs                     Exercise Diary  11/7 10/25 10/28 11/1 11   UBE 5m L2 4m L1 4m L1 5m L2 5m L2   TB MTP/LTP        TB ER/IR         S/L ER 2/10 2/10 2/10 2/10 2/10   S/L Abd         S/L Fwd Flex         Prone Y  10x 10x  10x palm down 10x 10x   Prone Row  10x 10x  10x palm down 10x 10x   Prone Horz Abd 10x  10x palm down 10x 10x   Prone Ext  10x 10x  10x palm down 10x 10x   Supine Press and Flex  2/10 Press 2/10 Press/flex 2/10 2/10 2/10   Wand AAROM  Supine flex 3/5 -------> 1/10 xxx   Finger Ladder         Shoulder Isometrics   HEP home     Shoulder AROM deferred Seated    Fl 90  Seated flexion with eccentric 10x AAROM 10x  2/10 active                           HEP Review             Modalities  11/7 10/25 10/28 11/1 11   MHP/CP  10m 10     HP/IFC 15m ----> 15m 15m 15m   US 10m   10m 10m

## 2019-11-11 ENCOUNTER — OFFICE VISIT (OUTPATIENT)
Dept: PHYSICAL THERAPY | Facility: CLINIC | Age: 69
End: 2019-11-11
Payer: MEDICARE

## 2019-11-11 DIAGNOSIS — M25.511 ACUTE PAIN OF RIGHT SHOULDER: Primary | ICD-10-CM

## 2019-11-11 PROCEDURE — 97110 THERAPEUTIC EXERCISES: CPT

## 2019-11-11 PROCEDURE — 97035 APP MDLTY 1+ULTRASOUND EA 15: CPT

## 2019-11-11 PROCEDURE — 97010 HOT OR COLD PACKS THERAPY: CPT

## 2019-11-11 NOTE — PROGRESS NOTES
Daily Note     Today's date: 2019  Patient name: Ivy Obrien  : 1950  MRN: [id number]  Referring provider: Gisella Noel PA-C  Dx:   Encounter Diagnosis     ICD-10-CM    1  Acute pain of right shoulder M25 511        Start Time: 1000  Stop Time: 1050  Total time in clinic (min): 50 minutes    Subjective: Patient reported ashiness in shoulder this session  Objective: PTA directed patient in UE strengthening and ROM exercise program, See treatment diary below  Assessment: Tolerated treatment well  Patient exhibited good technique with therapeutic exercises, ER and abduction cause most discomfort in shoulder  Plan: Continue per plan of care        Precautions: Right Shoulder Pain (Possible RTC/biceps partial tear)      Manual  11/7 11/11 10/28 11/1 11/4   Right Shoulder Streching & PROM  -------->  10 IR/ER only  prom 10 ir/er prom 10 ir/er prom   Right [address] Joint Mobs                     Exercise Diary  11/7 11/11 10/28 11/1 11/4   UBE 5m L2 6m L2 4m L1 5m L2 5m L2   TB MTP/LTP        TB ER/IR         S/L ER 2/10 2/10 2/10 2/10 2/10   S/L Abd         S/L Fwd Flex         Prone Y  10x 10x  10x palm down 10x 10x   Prone Row  10x 10x  10x palm down 10x 10x   Prone Horz Abd 10x  10x palm down 10x 10x   Prone Ext  10x 10x  10x palm down 10x 10x   Supine Press and Flex  2/10  2/10 Press/flex 2/10 2/10 2/10   Wand AAROM  Supine flex 3/5 -------> 1/10 xxx   Finger Ladder         Shoulder Isometrics    home     Shoulder AROM deferred Seated    Fl 90  Seated flexion with eccentric 10x AAROM 10x  2/10 active                           HEP Review             Modalities  11/7 11/11 10/28 11/1 11/4   MHP/CP   10     HP/IFC 15m 15m  15m 15m 15m   US 10m 10m   10m 10m

## 2019-11-15 ENCOUNTER — OFFICE VISIT (OUTPATIENT)
Dept: PHYSICAL THERAPY | Facility: CLINIC | Age: 69
End: 2019-11-15
Payer: MEDICARE

## 2019-11-15 DIAGNOSIS — M25.511 ACUTE PAIN OF RIGHT SHOULDER: Primary | ICD-10-CM

## 2019-11-15 PROCEDURE — 97010 HOT OR COLD PACKS THERAPY: CPT

## 2019-11-15 PROCEDURE — 97035 APP MDLTY 1+ULTRASOUND EA 15: CPT

## 2019-11-15 PROCEDURE — 97110 THERAPEUTIC EXERCISES: CPT

## 2019-11-15 PROCEDURE — 97014 ELECTRIC STIMULATION THERAPY: CPT

## 2019-11-15 NOTE — PROGRESS NOTES
=  Daily Note     Today's date: 11/15/2019  Patient name: Nirmala Fox  : 1950  MRN: [id number]  Referring provider: Shanice Johnson PA-C  Dx:   Encounter Diagnosis     ICD-10-CM    1  Acute pain of right shoulder M25 511        Start Time: 1000  Stop Time: 1105  Total time in clinic (min): 65 minutes    Subjective: Pt feels she is stronger although her pain with overhead motion continues  Objective: See treatment diary below      Assessment: Tolerated treatment well  Patient exhibited good technique with therapeutic exercises  Introduced theraband row/lino today per pt poc  Plan: Continue per plan of care        Precautions: Right Shoulder Pain (Possible RTC/biceps partial tear)      Manual  11/7 11/11 11/15 11/1 11   Right Shoulder Streching & PROM  -------->  10 IR/ER only  prom 10 ir/er prom 10 ir/er prom   Right [address] Joint Mobs                     Exercise Diary  11/7 11/11 11/15 11/1 11   UBE 5m L2 6m L2 6m L2 5m L2 5m L2   TB row/lino   L2 2/10 ea     TB ER/IR         S/L ER 2/10 2/10 1# 2 /10 2/10 2/10   S/L Abd         S/L Fwd Flex         Prone Y  10x 10x  10x palm down 10x 10x   Prone Row  10x 10x  10x palm down 10x 10x   Prone Horz Abd 10x  10x palm down 10x 10x   Prone Ext  10x 10x  10x palm down 10x 10x   Supine Press and Flex  2/10  2/10 Press/flex 2/10 2/10 2/10   Wand AAROM  Supine flex 3/5 -------> 1/10 xxx   Finger Ladder         Shoulder Isometrics    home     Shoulder AROM deferred Seated    Fl 90  Seated flex  90* 10x  2/10 active                           HEP Review             Modalities  11/7 11/11 11/15 11/1 11   MHP/CP        HP/IFC 15m 15m  15m 15m 15m   US 10m 10m  10m 10m 10m

## 2019-11-19 ENCOUNTER — OFFICE VISIT (OUTPATIENT)
Dept: PHYSICAL THERAPY | Facility: CLINIC | Age: 69
End: 2019-11-19
Payer: MEDICARE

## 2019-11-19 DIAGNOSIS — M25.511 ACUTE PAIN OF RIGHT SHOULDER: Primary | ICD-10-CM

## 2019-11-19 PROCEDURE — 97035 APP MDLTY 1+ULTRASOUND EA 15: CPT

## 2019-11-19 PROCEDURE — 97110 THERAPEUTIC EXERCISES: CPT

## 2019-11-19 PROCEDURE — 97014 ELECTRIC STIMULATION THERAPY: CPT

## 2019-11-19 NOTE — PROGRESS NOTES
Daily Note     Today's date: 2019  Patient name: Graeme Chavez  : 1950  MRN: [id number]  Referring provider: Simone Gamboa PA-C  Dx:   Encounter Diagnosis     ICD-10-CM    1  Acute pain of right shoulder M25 511        Start Time: 830  Stop Time: 935  Total time in clinic (min): 65 minutes    Subjective: Pt reports increased R shoulder pain since yesterday  She is using antiinflammatory medication/heat/ice at home to manage her sx  Objective: See treatment diary below  Pt performs exercise within a pain free range today  She required aarom for seated flexion  Assessment: Tolerated treatment fair  Patient would benefit from continued PT  Plan: Continue per plan of care        Precautions: Right Shoulder Pain (Possible RTC/biceps partial tear)      Manual  11/7 11/11 11/15 11/19 11/4   Right Shoulder Streching & PROM  -------->  10 IR/ER only  prom 10 ir/er prom 10 ir/er prom   Right [facility] Joint Mobs                     Exercise Diary  11/7 11/11 11/15 11/19 11/4   UBE 5m L2 6m L2 6m L2 5m L2 5m L2   TB row/lino   L2 2/10 ea L2 2/10    TB ER/IR         S/L ER 2/10 2/10 1# 2 /10 1# 3/10 2/10   S/L Abd         S/L Fwd Flex         Prone Y  10x 10x  10x palm down 10x 10x   Prone Row  10x 10x  10x palm down 10x 10x   Prone Horz Abd 10x  10x palm down 10x 10x   Prone Ext  10x 10x  10x palm down 10x 10x   Supine Press and Flex  2/10  2/10 Press/flex 2/10 2/10 2/10   Finger Ladder         Shoulder Isometrics    home     Shoulder AROM deferred Seated    Fl 90  Seated flex  90* 2/10 aarom  90* 2/10 active                           HEP Review             Modalities  11/7 11/11 11/15 11/19 11/4   MHP/CP        HP/IFC 15m 15m  15m 15m 15m   US 10m 10m  10m 10m 10m

## 2019-11-21 ENCOUNTER — APPOINTMENT (OUTPATIENT)
Dept: PHYSICAL THERAPY | Facility: CLINIC | Age: 69
End: 2019-11-21
Payer: MEDICARE

## 2019-11-22 ENCOUNTER — TRANSCRIBE ORDERS (OUTPATIENT)
Dept: PHYSICAL THERAPY | Facility: CLINIC | Age: 69
End: 2019-11-22

## 2019-11-22 ENCOUNTER — EVALUATION (OUTPATIENT)
Dept: PHYSICAL THERAPY | Facility: CLINIC | Age: 69
End: 2019-11-22
Payer: MEDICARE

## 2019-11-22 DIAGNOSIS — M25.511 ACUTE PAIN OF RIGHT SHOULDER: Primary | ICD-10-CM

## 2019-11-22 PROCEDURE — 97014 ELECTRIC STIMULATION THERAPY: CPT | Performed by: PHYSICAL THERAPIST

## 2019-11-22 PROCEDURE — 97110 THERAPEUTIC EXERCISES: CPT | Performed by: PHYSICAL THERAPIST

## 2019-11-22 PROCEDURE — 97140 MANUAL THERAPY 1/> REGIONS: CPT | Performed by: PHYSICAL THERAPIST

## 2019-11-22 NOTE — PROGRESS NOTES
PT Re-Evaluation     Today's date: 2019  Patient name: Elayne Coley  : 1950  MRN: [id number]  Referring provider: Justin Szymanski PA-C  Dx:   Encounter Diagnosis     ICD-10-CM    1  Acute pain of right shoulder M25 511                   Assessment  Assessment details: Pt has been seen for 10 visits of Outpatient physical therapy with treatment consisting of US and HP with IFC for relief of pain tenderness and inflammation stretching joint mobilization ROM strengthening and scapular stabilization exercises  Pt has demonstrated an overall decrease in pain with increase in ROM strength at R shoulder and elbow and improved tolerance for overhead reaching dressing and ability to perform household chores however pt still has increased pain with all IADL's limited from baseline level  Pt still presenting with ROM and strength deficits R shoulder and elbow with tenderness to palpation anterior and posterior shoulder  Pt is in need of continued outpatient physical therapy to address impairments and functional deficits  Pt to return to MD on Dec  2nd  Impairments: abnormal or restricted ROM, activity intolerance, impaired physical strength, lacks appropriate home exercise program, pain with function, scapular dyskinesis, weight-bearing intolerance and poor posture   Functional limitations: still limited with repetitive overhead reaching, cross body reaching, household chores and activities requiring pushing/pullingUnderstanding of Dx/Px/POC: good   Prognosis: good    Goals  Short Term Goals  1  Pt will decrease pain in right shoulder 25-50% in 4 wks-partially met  2  Pt will improve right shoulder ROM to [facility] in 4  wks- progressing but not met  3  Improve R shoulder and elbow strength by 1/2 muscle grade all motions tested- partially met  3  Improve overhead reaching into cabinets at home without increase in pain-progressing   4   Improve tolerance for all household chores with less increase in pain-met    Long Term Goals  1  Pt will decrease pain in right shoulder % in 8 wks-not met  2  Pt will improve right shoulder mm strength to 5/5 in 8 wks- not met  3  Pt will report no limitations with ADLs in 8 wks- not met  4  Pt will be able to perform overhead above shoulder level reaching pain free-not met  5  Pt will be able to don bra and belt without difficulty- not met  6  Pt will be able to perform all pushing pulling activity without increase in pain-not met  7  Pt will be independent with HEP  - met    Plan  Plan details: Pt tolerated treatment well this date with no pain noted after TE stretching joint mobilization and modalities  Patient would benefit from: skilled physical therapy  Planned modality interventions: cryotherapy, unattended electrical stimulation and thermotherapy: hydrocollator packs  Planned therapy interventions: abdominal trunk stabilization, flexibility, functional ROM exercises, home exercise program, joint mobilization, manual therapy, massage, neuromuscular re-education, patient education, postural training, strengthening, stretching and therapeutic exercise  Frequency: 2x week  Duration in weeks: 4  Plan of Care beginning date: 10/23/2019  Plan of Care expiration date: 2019  Treatment plan discussed with: patient        Subjective Evaluation    History of Present Illness  Mechanism of injury: Pt overall feels she is getting better  Pt's ache is now dull and intermittent which has allowed her to increase ability to perform household chores and activity  Pt also notes after last visit she was able to sleep better  Pt with increased pain free movement but still limited  Audrene Stamp still notes limitation in pushing pulling and reaching behind back  Scheduled to f/u with MD on      Pain  Current pain ratin  At best pain ratin  At worst pain ratin  Location: Right Shoulder   Quality: dull ache and sharp  Relieving factors: heat  Aggravating factors: lifting  Progression: improved    Hand dominance: right    Patient Goals  Patient goals for therapy: decreased pain, increased motion, increased strength, independence with ADLs/IADLs and return to sport/leisure activities          Objective     Palpation     Right   Tenderness of the anterior deltoid, biceps and teres minor  Additional Palpation Details  Tenderness still present anterior lateral and posterior shoulder  No tenderness over supraspinatus muscle belly    Tenderness     Right Shoulder  Tenderness in the acromion, biceps tendon (proximal) and bicipital groove  Cervical/Thoracic Screen   Cervical range of motion within normal limits    Active Range of Motion   Left Shoulder   External rotation BTH: C7   Internal rotation BTB: mid thoracic     Right Shoulder   Flexion: 125 degrees with pain  Extension: WFL  Abduction: 90 degrees with pain  External rotation 45°: 30 degrees with pain  External rotation BTH: C5 with pain  Internal rotation 45°: 68 degrees   Internal rotation BTB: L3 with pain    Additional Active Range of Motion Details  Supine flex-140  Supine abd-112  Supine ER-68    Strength/Myotome Testing     Left Shoulder   Normal muscle strength    Right Shoulder     Planes of Motion   Flexion: 4-   Extension: 4+   Abduction: 4-   Adduction: 4+   External rotation at 0°: 4-   Internal rotation at 0°: 4     Isolated Muscles   Biceps: 4   Triceps: 4   Upper trapezius: 4+     Tests     Right Shoulder   Positive empty can, lift-off and painful arc       Additional Tests Details  Able to perform lift off but still painful  Empty Can still positive    General Comments:      Shoulder Comments   -difficulty donning bra and donning belt  -Increased pain with overhead reaching- less pain with overhead reaching   -pt still with difficulty due to pain with pushing pulling reaching behind back and across body at shoulder level  -difficulty with household - able to tolerate more household chores but still limited Precautions: Right Shoulder Pain (Possible RTC/biceps partial tear)        Manual  11/7 11/11 11/15 11/19 11/22   Right Shoulder Streching & PROM  -------->   10 IR/ER only  prom 10 ir/er prom PROM all planes   Right [facility]                       Exercise Diary  11/7 11/11 11/15 11/19 11/22   UBE 5m L2 6m L2 6m L2 5m L2 6m L3   TB row/lino     L2 2/10 ea L2 2/10  L2 2/10   TB ER/IR              S/L ER 2/10 2/10 1# 2 /10 1# 3/10 1# 3/10   S/L Abd              S/L Fwd Flex              Prone Y  10x 10x  10x palm down 10x x10   Prone Row  10x 10x  10x palm down 10x x10   Prone Horz Abd 10x   10x palm down 10x x10   Prone Ext  10x 10x  10x palm down 10x x10   Supine Press and Flex  2/10  2/10 Press/flex 2/10 2/10 2/10   Finger Ladder              Shoulder Isometrics      home       Shoulder AROM deferred Seated    Fl 90  Seated flex  90* 2/10 aarom  90* 2/10 aarom                                             HEP Review                    Modalities  11/7 11/11 11/15 11/19 11/22   MHP/CP             HP/IFC 15m 15m  15m 15m 15m   US 10m 10m  10m 10m 10m

## 2019-11-22 NOTE — LETTER
2019    Ever Reddy PA-C  [address]    Patient: Yessi Mcclain   YOB: 1950   Date of Visit: 2019     Encounter Diagnosis     ICD-10-CM    1  Acute pain of right shoulder M25 511        Dear Dr Ascencion Her: Thank you for your recent referral of Yessi Mcclain  Please review the attached evaluation summary from Annalee's recent visit  Please verify that you agree with the plan of care by signing the attached order  If you have any questions or concerns, please do not hesitate to call  I sincerely appreciate the opportunity to share in the care of one of your patients and hope to have another opportunity to work with you in the near future  Sincerely,    Bernice Nguyễn, PT      Referring Provider:      I certify that I have read the below Plan of Care and certify the need for these services furnished under this plan of treatment while under my care  Ever Reddy PA-C  [address]  [facility]  [address]: [phone]          PT Re-Evaluation     Today's date: 2019  Patient name: Yessi Mcclain  : 1950  MRN: [id number]  Referring provider: Osmin Vasquez PA-C  Dx:   Encounter Diagnosis     ICD-10-CM    1  Acute pain of right shoulder M25 511                   Assessment  Assessment details: Pt has been seen for 10 visits of Outpatient physical therapy with treatment consisting of US and HP with IFC for relief of pain tenderness and inflammation stretching joint mobilization ROM strengthening and scapular stabilization exercises  Pt has demonstrated an overall decrease in pain with increase in ROM strength at R shoulder and elbow and improved tolerance for overhead reaching dressing and ability to perform household chores however pt still has increased pain with all IADL's limited from baseline level   Pt still presenting with ROM and strength deficits R shoulder and elbow with tenderness to palpation anterior and posterior shoulder  Pt is in need of continued outpatient physical therapy to address impairments and functional deficits  Pt to return to MD on Dec  2nd  Impairments: abnormal or restricted ROM, activity intolerance, impaired physical strength, lacks appropriate home exercise program, pain with function, scapular dyskinesis, weight-bearing intolerance and poor posture   Functional limitations: still limited with repetitive overhead reaching, cross body reaching, household chores and activities requiring pushing/pullingUnderstanding of Dx/Px/POC: good   Prognosis: good    Goals  Short Term Goals  1  Pt will decrease pain in right shoulder 25-50% in 4 wks-partially met  2  Pt will improve right shoulder ROM to [facility] in 4  wks- progressing but not met  3  Improve R shoulder and elbow strength by 1/2 muscle grade all motions tested- partially met  3  Improve overhead reaching into cabinets at home without increase in pain-progressing   4  Improve tolerance for all household chores with less increase in pain-met    Long Term Goals  1  Pt will decrease pain in right shoulder % in 8 wks-not met  2  Pt will improve right shoulder mm strength to 5/5 in 8 wks- not met  3  Pt will report no limitations with ADLs in 8 wks- not met  4  Pt will be able to perform overhead above shoulder level reaching pain free-not met  5  Pt will be able to don bra and belt without difficulty- not met  6  Pt will be able to perform all pushing pulling activity without increase in pain-not met  7  Pt will be independent with HEP  - met    Plan  Plan details: Pt tolerated treatment well this date with no pain noted after TE stretching joint mobilization and modalities      Patient would benefit from: skilled physical therapy  Planned modality interventions: cryotherapy, unattended electrical stimulation and thermotherapy: hydrocollator packs  Planned therapy interventions: abdominal trunk stabilization, flexibility, functional ROM exercises, home exercise program, joint mobilization, manual therapy, massage, neuromuscular re-education, patient education, postural training, strengthening, stretching and therapeutic exercise  Frequency: 2x week  Duration in weeks: 4  Plan of Care beginning date: 10/23/2019  Plan of Care expiration date: 2019  Treatment plan discussed with: patient        Subjective Evaluation    History of Present Illness  Mechanism of injury: Pt overall feels she is getting better  Pt's ache is now dull and intermittent which has allowed her to increase ability to perform household chores and activity  Pt also notes after last visit she was able to sleep better  Pt with increased pain free movement but still limited  Dorothyann Karen still notes limitation in pushing pulling and reaching behind back  Scheduled to f/u with MD on   Pain  Current pain ratin  At best pain ratin  At worst pain ratin  Location: Right Shoulder   Quality: dull ache and sharp  Relieving factors: heat  Aggravating factors: lifting  Progression: improved    Hand dominance: right    Patient Goals  Patient goals for therapy: decreased pain, increased motion, increased strength, independence with ADLs/IADLs and return to sport/leisure activities          Objective     Palpation     Right   Tenderness of the anterior deltoid, biceps and teres minor  Additional Palpation Details  Tenderness still present anterior lateral and posterior shoulder  No tenderness over supraspinatus muscle belly    Tenderness     Right Shoulder  Tenderness in the acromion, biceps tendon (proximal) and bicipital groove       Cervical/Thoracic Screen   Cervical range of motion within normal limits    Active Range of Motion   Left Shoulder   External rotation BTH: C7   Internal rotation BTB: mid thoracic     Right Shoulder   Flexion: 125 degrees with pain  Extension: WFL  Abduction: 90 degrees with pain  External rotation 45°:  30 degrees with pain  External rotation BTH: C5 with pain  Internal rotation 45°:  68 degrees   Internal rotation BTB: L3 with pain    Additional Active Range of Motion Details  Supine flex-140  Supine abd-112  Supine ER-68    Strength/Myotome Testing     Left Shoulder   Normal muscle strength    Right Shoulder     Planes of Motion   Flexion: 4-   Extension: 4+   Abduction: 4-   Adduction: 4+   External rotation at 0°:  4-   Internal rotation at 0°:  4     Isolated Muscles   Biceps: 4   Triceps: 4   Upper trapezius: 4+     Tests     Right Shoulder   Positive empty can, lift-off and painful arc       Additional Tests Details  Able to perform lift off but still painful  Empty Can still positive    General Comments:      Shoulder Comments   -difficulty donning bra and donning belt  -Increased pain with overhead reaching- less pain with overhead reaching   -pt still with difficulty due to pain with pushing pulling reaching behind back and across body at shoulder level  -difficulty with household - able to tolerate more household chores but still limited            Precautions: Right Shoulder Pain (Possible RTC/biceps partial tear)        Manual  11/7 11/11 11/15 11/19 11/22   Right Shoulder Streching & PROM  -------->   10 IR/ER only  prom 10 ir/er prom PROM all planes   Right [facility]                       Exercise Diary  11/7 11/11 11/15 11/19 11/22   UBE 5m L2 6m L2 6m L2 5m L2 6m L3   TB row/lino     L2 2/10 ea L2 2/10  L2 2/10   TB ER/IR              S/L ER 2/10 2/10 1# 2 /10 1# 3/10 1# 3/10   S/L Abd              S/L Fwd Flex              Prone Y  10x 10x  10x palm down 10x x10   Prone Row  10x 10x  10x palm down 10x x10   Prone Horz Abd 10x   10x palm down 10x x10   Prone Ext  10x 10x  10x palm down 10x x10   Supine Press and Flex  2/10  2/10 Press/flex 2/10 2/10 2/10   Finger Ladder              Shoulder Isometrics      home       Shoulder AROM deferred Seated    Fl 90  Seated flex  90* 2/10 jonelle  90* 2/10 jonelle                                             HEP Review                    Modalities  11/7 11/11 11/15 11/19 11/22   MHP/CP             HP/IFC 15m 15m  15m 15m 15m   US 10m 10m  10m 10m 10m

## 2019-11-25 ENCOUNTER — OFFICE VISIT (OUTPATIENT)
Dept: PHYSICAL THERAPY | Facility: CLINIC | Age: 69
End: 2019-11-25
Payer: MEDICARE

## 2019-11-25 DIAGNOSIS — M25.511 ACUTE PAIN OF RIGHT SHOULDER: Primary | ICD-10-CM

## 2019-11-25 PROCEDURE — 97140 MANUAL THERAPY 1/> REGIONS: CPT

## 2019-11-25 PROCEDURE — 97014 ELECTRIC STIMULATION THERAPY: CPT

## 2019-11-25 PROCEDURE — 97110 THERAPEUTIC EXERCISES: CPT

## 2019-11-25 PROCEDURE — 97035 APP MDLTY 1+ULTRASOUND EA 15: CPT

## 2019-11-25 PROCEDURE — 97010 HOT OR COLD PACKS THERAPY: CPT

## 2019-11-25 NOTE — PROGRESS NOTES
Daily Note     Today's date: 2019  Patient name: Blaise Cooper  : 1950  MRN: [id number]  Referring provider: Jeremi Longo PA-C  Dx:   Encounter Diagnosis     ICD-10-CM    1  Acute pain of right shoulder M25 511        Start Time: 0830  Stop Time: 0940  Total time in clinic (min): 70 minutes    Subjective: Patient offered c/o anterior shoulder pain in to bicep  Objective: PTA directed patient in TE program, modalities post treat, See treatment diary below  Assessment: Tolerated treatment well  Patient exhibited good technique with therapeutic exercises, discomfort noted with OH ROM  Plan: Continue per plan of care        Precautions: Right Shoulder Pain (Possible RTC/biceps partial tear)        Manual  11/25 11/11 11/15 11/19 11/22   Right Shoulder Streching & PROM  PROM all planes   10 IR/ER only  prom 10 ir/er prom PROM all planes   Right [facility]                       Exercise Diary  11/25 11/11 11/15 11/19 11/22   UBE 6m L3 6m L2 6m L2 5m L2 6m L3   TB row/lino  L2 2/10   L2 2/10 ea L2 2/10  L2 2/10   TB ER/IR              S/L ER 2/10 1#  2/10 1# 2 /10 1# 3/10 1# 3/10   S/L Abd              S/L Fwd Flex              Prone Y  10x 10x  10x palm down 10x x10   Prone Row  10x 10x  10x palm down 10x x10   Prone Horz Abd 10x   10x palm down 10x x10   Prone Ext  10x 10x  10x palm down 10x x10   Supine Press and Flex  2/10  2/10 Press/flex 2/10 2/10 2/10   Finger Ladder              Shoulder Isometrics      home       Shoulder AROM 2/10 aarom  Seated    Fl 90  Seated flex  90* 2/10 aarom  90* 2/10 aarom                                             HEP Review                    Modalities  11/25 11/11 11/15 11/19 11/22   MHP/CP             HP/IFC 15m 15m  15m 15m 15m   US 10m 10m  10m 10m 10m

## 2019-11-27 ENCOUNTER — OFFICE VISIT (OUTPATIENT)
Dept: PHYSICAL THERAPY | Facility: CLINIC | Age: 69
End: 2019-11-27
Payer: MEDICARE

## 2019-11-27 DIAGNOSIS — M25.511 ACUTE PAIN OF RIGHT SHOULDER: Primary | ICD-10-CM

## 2019-11-27 PROCEDURE — 97110 THERAPEUTIC EXERCISES: CPT

## 2019-11-27 PROCEDURE — 97010 HOT OR COLD PACKS THERAPY: CPT

## 2019-11-27 PROCEDURE — 97035 APP MDLTY 1+ULTRASOUND EA 15: CPT

## 2019-11-27 PROCEDURE — 97140 MANUAL THERAPY 1/> REGIONS: CPT

## 2019-11-27 PROCEDURE — 97014 ELECTRIC STIMULATION THERAPY: CPT

## 2019-11-27 NOTE — PROGRESS NOTES
Daily Note     Today's date: 2019  Patient name: Natalie Jay  : 1950  MRN: [id number]  Referring provider: Shannon Ziegler PA-C  Dx:   Encounter Diagnosis     ICD-10-CM    1  Acute pain of right shoulder M25 511        Start Time: 0830  Stop Time: 0940  Total time in clinic (min): 70 minutes    Subjective: Patient reported shoulder motion improvement, however pain remaining  She is scheduled for an MRI on Monday, 19  Objective: PTA directed patient in UE ROM/strengthening program, followed by modalities to R shoulder seated, See treatment diary below  Assessment: Tolerated treatment well  Patient exhibited good technique with therapeutic exercises, discomfort at end range with PROM  Plan: Will await recommendations from doctor following MRI        Precautions: Right Shoulder Pain (Possible RTC/biceps partial tear)        Manual  11/25 11/27 11/15 11/19 11/22   Right Shoulder Streching & PROM   PROM all planes  PROM all planes 10 IR/ER only  prom 10 ir/er prom PROM all planes   Right [facility]                       Exercise Diary  11/25 11/27 11/15 11/19 11/22   UBE 6m L3 6m L3 6m L2 5m L2 6m L3   TB row/lino  L2 2/10  L2 2/10 L2 2/10 ea L2 2/10  L2 2/10   TB ER/IR              S/L ER 2/10 1#  2/10 2#  1# 2 /10 1# 3/10 1# 3/10   S/L Abd              S/L Fwd Flex              Prone Y  10x 20x  10x palm down 10x x10   Prone Row  10x 20x  10x palm down 10x x10   Prone Horz Abd 10x  20x 10x palm down 10x x10   Prone Ext  10x 20x  10x palm down 10x x10   Supine Press and Flex  2/10  2/10 Press/flex 2/10 2/10 2/10   Finger Ladder              Shoulder Isometrics      home       Shoulder AROM 2/10 aarom  Seated  flex,abd,er Seated flex  90* 2/10 aarom  90* 2/10 aarom                                             HEP Review                    Modalities  11/25 11/27 11/15 11/19 11/22   MHP/CP             HP/IFC 15m 15m  15m 15m 15m   US 10m 10m  10m 10m 10m

## 2019-12-24 NOTE — PROGRESS NOTES
PT Discharge    Today's date: 2019  Patient name: Tiffanie Fuchs  : 1950  MRN: [id number]  Referring provider: Justine Varner PA-C  Dx:   Encounter Diagnosis     ICD-10-CM    1  Acute pain of right shoulder M25 511          Assessment  Assessment details: Pt has been seen for 12 visits of Outpatient physical therapy with treatment consisting of US and HP with IFC for relief of pain tenderness and inflammation stretching joint mobilization ROM strengthening and scapular stabilization exercises  Pt made fair to good progress with PT however pt still with limitation in ROM strength and function  Pt had MRI and had follow-up with MD who has scheduled arthroscopic surgery on 2020  Pt will be discharged from Outpatient PT as she has reached max benefit  Pt will be evaluated once post-op  Pt will continue with HEP  Impairments: abnormal or restricted ROM, activity intolerance, impaired physical strength, lacks appropriate home exercise program, pain with function, scapular dyskinesis, weight-bearing intolerance and poor posture   Functional limitations: still limited with repetitive overhead reaching, cross body reaching, household chores and activities requiring pushing/pullingUnderstanding of Dx/Px/POC: good   Prognosis: good    Goals  Short Term Goals  1  Pt will decrease pain in right shoulder 25-50% in 4 wks-partially met  2  Pt will improve right shoulder ROM to [facility] in 4  wks- progressing but not met  3  Improve R shoulder and elbow strength by 1/2 muscle grade all motions tested- partially met  3  Improve overhead reaching into cabinets at home without increase in pain-progressing   4  Improve tolerance for all household chores with less increase in pain-met    Long Term Goals  1  Pt will decrease pain in right shoulder % in 8 wks-not met  2  Pt will improve right shoulder mm strength to 5/5 in 8 wks- not met  3  Pt will report no limitations with ADLs in 8 wks- not met  4   Pt will be able to perform overhead above shoulder level reaching pain free-not met  5  Pt will be able to don bra and belt without difficulty- not met  6  Pt will be able to perform all pushing pulling activity without increase in pain-not met  7  Pt will be independent with HEP  - met    Plan  Plan details: D/C PT  Will evaluate pt post-op  Planned therapy interventions: home exercise program  Plan of Care beginning date: 10/23/2019  Plan of Care expiration date: 2019  Treatment plan discussed with: patient        Subjective Evaluation    History of Present Illness  Mechanism of injury: Pt had MRI and saw MD  Pt states she has a small tear in her rotator cuff tendonitis and fluid in shoulder  Pt states MD has scheduled her for surgery on 2020  Pt therefore will d/c from PT and be re-evaluated post op day 1 on 2020  Pain  Current pain ratin  At best pain ratin  At worst pain ratin  Location: Right Shoulder   Quality: dull ache and sharp  Relieving factors: heat  Aggravating factors: lifting  Progression: improved    Patient Goals  Patient goals for therapy: decreased pain, increased motion, increased strength, independence with ADLs/IADLs and return to sport/leisure activities          Objective     Palpation     Right   Tenderness of the anterior deltoid, biceps and teres minor  Additional Palpation Details  Tenderness still present anterior lateral and posterior shoulder  No tenderness over supraspinatus muscle belly    Tenderness     Right Shoulder  Tenderness in the acromion, biceps tendon (proximal) and bicipital groove       Cervical/Thoracic Screen   Cervical range of motion within normal limits    Active Range of Motion   Left Shoulder   External rotation BTH: C7   Internal rotation BTB: mid thoracic     Right Shoulder   Flexion: 125 degrees with pain  Extension: WFL  Abduction: 90 degrees with pain  External rotation 45°: 30 degrees with pain  External rotation BTH: C5 with pain  Internal rotation 45°: 68 degrees   Internal rotation BTB: L3 with pain    Additional Active Range of Motion Details  Supine flex-140  Supine abd-112  Supine ER-68    Strength/Myotome Testing     Left Shoulder   Normal muscle strength    Right Shoulder     Planes of Motion   Flexion: 4-   Extension: 4+   Abduction: 4-   Adduction: 4+   External rotation at 0°: 4-   Internal rotation at 0°: 4     Isolated Muscles   Biceps: 4   Triceps: 4   Upper trapezius: 4+     Tests     Right Shoulder   Positive empty can, lift-off and painful arc       Additional Tests Details  Able to perform lift off but still painful  Empty Can still positive    General Comments:      Shoulder Comments   -difficulty donning bra and donning belt  -Increased pain with overhead reaching- less pain with overhead reaching   -pt still with difficulty due to pain with pushing pulling reaching behind back and across body at shoulder level  -difficulty with household - able to tolerate more household chores but still limited            Precautions: Right Shoulder Pain (Possible RTC/biceps partial tear)

## 2020-01-22 ENCOUNTER — EVALUATION (OUTPATIENT)
Dept: PHYSICAL THERAPY | Facility: CLINIC | Age: 70
End: 2020-01-22
Payer: MEDICARE

## 2020-01-22 ENCOUNTER — TRANSCRIBE ORDERS (OUTPATIENT)
Dept: PHYSICAL THERAPY | Facility: CLINIC | Age: 70
End: 2020-01-22

## 2020-01-22 DIAGNOSIS — M25.811 OTHER SPECIFIED JOINT DISORDERS, RIGHT SHOULDER: Primary | ICD-10-CM

## 2020-01-22 DIAGNOSIS — M25.511 ACUTE PAIN OF RIGHT SHOULDER: ICD-10-CM

## 2020-01-22 DIAGNOSIS — Z98.890 S/P ARTHROSCOPY OF RIGHT SHOULDER: ICD-10-CM

## 2020-01-22 PROCEDURE — 97162 PT EVAL MOD COMPLEX 30 MIN: CPT | Performed by: PHYSICAL THERAPIST

## 2020-01-22 PROCEDURE — 97010 HOT OR COLD PACKS THERAPY: CPT | Performed by: PHYSICAL THERAPIST

## 2020-01-22 PROCEDURE — 97110 THERAPEUTIC EXERCISES: CPT | Performed by: PHYSICAL THERAPIST

## 2020-01-22 NOTE — PROGRESS NOTES
PT Evaluation     Today's date: 2020  Patient name: Alejandro Weinstein  : 1950  MRN: [id number]  Referring provider: Edinson Camp MD  Dx:   Encounter Diagnosis     ICD-10-CM    1  Other specified joint disorders, right shoulder M25 811    2  S/P arthroscopy of right shoulder Z98 890    3  Acute pain of right shoulder M25 511                   Assessment  Assessment details: Pt is a 81year old female s/p R shoulder arthroscopic surgery for rotator cuff repair, acromioplasty, labral debridement and coplaning of distal clavicle  Pt presents in a sling NWB R UE  Pt with decreased sensation R UE from nerve block with increased pain, decreased ROM strength and functional use of R UE  Pt due to being post op is restricted from all functional use of R UE and is in need of continued PT to address all impairments and functional deficits with treatment per rotator cuff protocol  Pt is to be in sling x 4 weeks  Impairments: abnormal or restricted ROM, activity intolerance, impaired physical strength, lacks appropriate home exercise program, pain with function and weight-bearing intolerance  Functional limitations: pt in sling and unable to use R UEUnderstanding of Dx/Px/POC: good   Prognosis: good    Goals  STG's to be met in 3-4 weeks  1  Decrease R shoulder pain to 3-4/10 at rest  2  Improve PROM R shoulder to flex at least 120, ER 70 degrees IR to 35 degrees per protocol  3  Pt will be able to don and doff sling Independently  4  Pt will be (I) with HEP  5  Pt will be able to sleep through night without waking    LTG's to be met in 10-12 weeks:  1  Decrease R shoulder pain with activity/movement to 0-2/10  2  Improve R shoulder AROM to [facility] all motions needed for ADL's  3  Pt will demonstrate R shoulder strength within 3-5lbs of L  4  Pt will perform all ADL's and dressing without modification of technique  5  Pt will be able to reach to and above shoulder level without difficulty  6   Pt will be able to lift 10lbs to or above shoulder level  7  Pt will return to driving  8  Improve Foto score to at least 55        Plan  Plan details: Will reassess in 4 weeks with probable increase to 3x per week when pt able to progress to AROM  Performed post-op dressing removal and TE  performed for HEP per protocol  Pt declines need for illustrations of TE  Decreased stiffness R shoulder post TE  Patient would benefit from: skilled physical therapy  Planned modality interventions: cryotherapy and unattended electrical stimulation  Other planned modality interventions: modalities to be added or modified at discretion of therapist  Planned therapy interventions: joint mobilization, manual therapy, neuromuscular re-education, patient education, postural training, therapeutic exercise, stretching, home exercise program and self care  Frequency: 2x week  Duration in weeks: 4  Plan of Care beginning date: 2020  Plan of Care expiration date: 3/22/2020  Treatment plan discussed with: patient and PTA        Subjective Evaluation    History of Present Illness  Date of onset: 2019  Date of surgery: 2020  Mechanism of injury: Pt is a 81year old female who began experiencing R shoulder pain in September after lifting heavy object into cart  Pt had course of Outpatient PT with max benefit reached  Pt requiring RTC repair therefore underwent arthroscopic RTC repair with acromioplasty on 2020  Pt presents in sling NWB R UE             Not a recurrent problem   Quality of life: good    Pain  Current pain ratin  At best pain ratin  At worst pain ratin  Location: R shoulder  Quality: dull ache  Relieving factors: medications and ice    Social Support  Steps to enter house: yes  6  Lives in: multiple-level home (Bi-level)  Lives with: spouse    Employment status: not working  Hand dominance: right      Diagnostic Tests  MRI studies: abnormal    FCE comments: R shoulder MRI showed rotator cuff tearTreatments  Previous treatment: physical therapy and injection treatment  Current treatment: medication and physical therapy  Patient Goals  Patient goals for therapy: decreased pain, increased motion, increased strength, independence with ADLs/IADLs and return to sport/leisure activities  Patient goal: being able to dress myself        Objective     Cervical/Thoracic Screen   Cervical range of motion within normal limits    Neurological Testing     Sensation     Shoulder   Left Shoulder   Intact: light touch    Right Shoulder   Diminished: light touch    Comments   Right light touch: pt had nerve block yesterday    Additional Neurological Details  Decreased sensation C3-C5 dermatomes due to nerve block still active    Active Range of Motion     Additional Active Range of Motion Details  Pt unable to perform AROM per protocol due to RTC repair 1/21/2020  Elbow flex: AROM -3-145 degrees    Wrist and hand WFL:  : WFL    Passive Range of Motion   Left Shoulder   Flexion: 70 degrees   Abduction: 60 degrees   External rotation 0°: 10 degrees   Internal rotation 0°: 45 degrees     Additional Passive Range of Motion Details  PROM assessed sitting    Strength/Myotome Testing     Additional Strength Details  Shoulder                  R                 L  Flex                     NT              21 lbs  Abd                     NT              19 lbs  IR                        NT              19 lbs  ER                      NT               19 lbs    Elbow  Flex                     NT lbs            17 lbs  Ext                       NT lbs           22 lbs    General Comments:      Shoulder Comments   Pt unable to use R arm due to arm in sling x 4 weeks per rotator cuff protocol  Foto Score 4      Flowsheet Rows      Most Recent Value   PT/OT G-Codes   Current Score  4   Projected Score  55             Precautions:  Follow protocol  Reassessment Due: 2/24/2020  Surgery Date: 1/21/2020        Date: 1/22       Visit # 1       Pain Level 4 Week post op Day 1                       manual        PROM R shoulder ------>       Jot zachary CHATMAN'                Exercise Diary         pendulumns x30       shrugs x30       retraction x30       Elbow wrist ROM x30       Gripper/ball squeeze x30                                                                                                                               Modalities        CP 15       IFC R shoulder ----->

## 2020-01-22 NOTE — LETTER
2020    Carmen Bazan MD  [facility]    Patient: Price Guerrero   YOB: 1950   Date of Visit: 2020     Encounter Diagnosis     ICD-10-CM    1  Other specified joint disorders, right shoulder M25 811    2  S/P arthroscopy of right shoulder Z98 890    3  Acute pain of right shoulder M25 511        Dear Dr Beth Solano: Thank you for your recent referral of Price Guerrero  Please review the attached evaluation summary from Annalee's recent visit  Please verify that you agree with the plan of care by signing the attached order  If you have any questions or concerns, please do not hesitate to call  I sincerely appreciate the opportunity to share in the care of one of your patients and hope to have another opportunity to work with you in the near future  Sincerely,    Delores Goncalves PT      Referring Provider:      I certify that I have read the below Plan of Care and certify the need for these services furnished under this plan of treatment while under my care  Carmen Bazan MD  [facility] 31: [phone]          PT Evaluation     Today's date: 2020  Patient name: Price Guerrero  : 1950  MRN: [id number]  Referring provider: Darcy Gunter MD  Dx:   Encounter Diagnosis     ICD-10-CM    1  Other specified joint disorders, right shoulder M25 811    2  S/P arthroscopy of right shoulder Z98 890    3  Acute pain of right shoulder M25 511                   Assessment  Assessment details: Pt is a 81year old female s/p R shoulder arthroscopic surgery for rotator cuff repair, acromioplasty, labral debridement and coplaning of distal clavicle  Pt presents in a sling NWB R UE  Pt with decreased sensation R UE from nerve block with increased pain, decreased ROM strength and functional use of R UE   Pt due to being post op is restricted from all functional use of R UE and is in need of continued PT to address all impairments and functional deficits with treatment per rotator cuff protocol  Pt is to be in sling x 4 weeks  Impairments: abnormal or restricted ROM, activity intolerance, impaired physical strength, lacks appropriate home exercise program, pain with function and weight-bearing intolerance  Functional limitations: pt in sling and unable to use R UEUnderstanding of Dx/Px/POC: good   Prognosis: good    Goals  STG's to be met in 3-4 weeks  1  Decrease R shoulder pain to 3-4/10 at rest  2  Improve PROM R shoulder to flex at least 120, ER 70 degrees IR to 35 degrees per protocol  3  Pt will be able to don and doff sling Independently  4  Pt will be (I) with HEP  5  Pt will be able to sleep through night without waking    LTG's to be met in 10-12 weeks:  1  Decrease R shoulder pain with activity/movement to 0-2/10  2  Improve R shoulder AROM to [facility] all motions needed for ADL's  3  Pt will demonstrate R shoulder strength within 3-5lbs of L  4  Pt will perform all ADL's and dressing without modification of technique  5  Pt will be able to reach to and above shoulder level without difficulty  6  Pt will be able to lift 10lbs to or above shoulder level  7  Pt will return to driving  8  Improve Foto score to at least 55        Plan  Plan details: Will reassess in 4 weeks with probable increase to 3x per week when pt able to progress to AROM  Performed post-op dressing removal and TE  performed for HEP per protocol  Pt declines need for illustrations of TE  Decreased stiffness R shoulder post TE    Patient would benefit from: skilled physical therapy  Planned modality interventions: cryotherapy and unattended electrical stimulation  Other planned modality interventions: modalities to be added or modified at discretion of therapist  Planned therapy interventions: joint mobilization, manual therapy, neuromuscular re-education, patient education, postural training, therapeutic exercise, stretching, home exercise program and self care  Frequency: 2x week  Duration in weeks: 4  Plan of Care beginning date: 2020  Plan of Care expiration date: 3/22/2020  Treatment plan discussed with: patient and PTA        Subjective Evaluation    History of Present Illness  Date of onset: 2019  Date of surgery: 2020  Mechanism of injury: Pt is a 81year old female who began experiencing R shoulder pain in September after lifting heavy object into cart  Pt had course of Outpatient PT with max benefit reached  Pt requiring RTC repair therefore underwent arthroscopic RTC repair with acromioplasty on 2020  Pt presents in sling NWB R UE             Not a recurrent problem   Quality of life: good    Pain  Current pain ratin  At best pain ratin  At worst pain ratin  Location: R shoulder  Quality: dull ache  Relieving factors: medications and ice    Social Support  Steps to enter house: yes  6  Lives in: multiple-level home (Bi-level)  Lives with: spouse    Employment status: not working  Hand dominance: right      Diagnostic Tests  MRI studies: abnormal    FCE comments: R shoulder MRI showed rotator cuff tearTreatments  Previous treatment: physical therapy and injection treatment  Current treatment: medication and physical therapy  Patient Goals  Patient goals for therapy: decreased pain, increased motion, increased strength, independence with ADLs/IADLs and return to sport/leisure activities  Patient goal: being able to dress myself        Objective     Cervical/Thoracic Screen   Cervical range of motion within normal limits    Neurological Testing     Sensation     Shoulder   Left Shoulder   Intact: light touch    Right Shoulder   Diminished: light touch    Comments   Right light touch: pt had nerve block yesterday    Additional Neurological Details  Decreased sensation C3-C5 dermatomes due to nerve block still active    Active Range of Motion     Additional Active Range of Motion Details  Pt unable to perform AROM per protocol due to RTC repair 1/21/2020  Elbow flex: AROM -3-145 degrees    Wrist and hand WFL:  : WFL    Passive Range of Motion   Left Shoulder   Flexion: 70 degrees   Abduction: 60 degrees   External rotation 0°:  10 degrees   Internal rotation 0°:  45 degrees     Additional Passive Range of Motion Details  PROM assessed sitting    Strength/Myotome Testing     Additional Strength Details  Shoulder                  R                 L  Flex                     NT              21 lbs  Abd                     NT              19 lbs  IR                        NT              19 lbs  ER                      NT               19 lbs    Elbow  Flex                     NT lbs            17 lbs  Ext                       NT lbs           22 lbs    General Comments:      Shoulder Comments   Pt unable to use R arm due to arm in sling x 4 weeks per rotator cuff protocol  Foto Score 4      Flowsheet Rows      Most Recent Value   PT/OT G-Codes   Current Score  4   Projected Score  55             Precautions:  Follow protocol  Reassessment Due: 2/24/2020  Surgery Date: 1/21/2020        Date: 1/22       Visit # 1       Pain Level 4       Week post op Day 1                       manual        PROM R shoulder ------>       Jot zachary CHATMAN'                Exercise Diary         pendulumns x30       shrugs x30       retraction x30       Elbow wrist ROM x30       Gripper/ball squeeze x30                                                                                                                               Modalities        CP 15       IFC R shoulder ----->

## 2020-01-24 ENCOUNTER — OFFICE VISIT (OUTPATIENT)
Dept: PHYSICAL THERAPY | Facility: CLINIC | Age: 70
End: 2020-01-24
Payer: MEDICARE

## 2020-01-24 DIAGNOSIS — Z98.890 S/P ARTHROSCOPY OF RIGHT SHOULDER: ICD-10-CM

## 2020-01-24 DIAGNOSIS — M25.811 OTHER SPECIFIED JOINT DISORDERS, RIGHT SHOULDER: Primary | ICD-10-CM

## 2020-01-24 DIAGNOSIS — M25.511 ACUTE PAIN OF RIGHT SHOULDER: ICD-10-CM

## 2020-01-24 PROCEDURE — 97110 THERAPEUTIC EXERCISES: CPT

## 2020-01-24 PROCEDURE — 97140 MANUAL THERAPY 1/> REGIONS: CPT

## 2020-01-24 PROCEDURE — 97010 HOT OR COLD PACKS THERAPY: CPT

## 2020-01-24 PROCEDURE — 97014 ELECTRIC STIMULATION THERAPY: CPT

## 2020-01-24 NOTE — PROGRESS NOTES
Daily Note     Today's date: 2020  Patient name: Blaise Cooper  : 1950  MRN: [id number]  Referring provider: Viraj Meng MD  Dx:   Encounter Diagnosis     ICD-10-CM    1  Other specified joint disorders, right shoulder M25 811    2  S/P arthroscopy of right shoulder Z98 890    3  Acute pain of right shoulder M25 511        Start Time: 1100  Stop Time: 1145  Total time in clinic (min): 45 minutes    Subjective: Patient reported soreness in shoulder following initial evaluation, however less pain  Objective: PTA directed patient in TE program, and performed gentle PROM within limitations, See treatment diary below  Added IFC/CP at end of treat  Assessment: Tolerated treatment well  Patient noted "pinching" in shoulder with exercises  Increased discomfort noted in shoulder with PROM  Plan: Continue per plan of care  Precautions:  Follow protocol  Reassessment Due: 2020  Surgery Date: 2020        Date:       Visit # 1 2      Pain Level 4 3      Week post op Day 1                       manual        PROM R shoulder ------> PORFIRIO CHATMAN'                Exercise Diary         pendulumns x30 30x      shrugs x30 30x      retraction x30 30x      Elbow wrist ROM x30 30x      Gripper/ball squeeze x30 30x                                                                                                                              Modalities        CP 15       IFC R shoulder -----> 15

## 2020-01-27 ENCOUNTER — OFFICE VISIT (OUTPATIENT)
Dept: PHYSICAL THERAPY | Facility: CLINIC | Age: 70
End: 2020-01-27
Payer: MEDICARE

## 2020-01-27 DIAGNOSIS — M25.511 ACUTE PAIN OF RIGHT SHOULDER: ICD-10-CM

## 2020-01-27 DIAGNOSIS — M25.811 OTHER SPECIFIED JOINT DISORDERS, RIGHT SHOULDER: Primary | ICD-10-CM

## 2020-01-27 DIAGNOSIS — Z98.890 S/P ARTHROSCOPY OF RIGHT SHOULDER: ICD-10-CM

## 2020-01-27 PROCEDURE — 97140 MANUAL THERAPY 1/> REGIONS: CPT

## 2020-01-27 PROCEDURE — 97010 HOT OR COLD PACKS THERAPY: CPT

## 2020-01-27 PROCEDURE — 97014 ELECTRIC STIMULATION THERAPY: CPT

## 2020-01-27 PROCEDURE — 97110 THERAPEUTIC EXERCISES: CPT

## 2020-01-27 NOTE — PROGRESS NOTES
Daily Note     Today's date: 2020  Patient name: Betty Gatica  : 1950  MRN: [id number]  Referring provider: Salud Arevalo MD  Dx:   Encounter Diagnosis     ICD-10-CM    1  Other specified joint disorders, right shoulder M25 811    2  S/P arthroscopy of right shoulder Z98 890    3  Acute pain of right shoulder M25 511        Start Time: 0830  Stop Time: 915  Total time in clinic (min): 45 minutes    Subjective: Patient reported stiff and soreness  in shoulder in the mornings, and that she "started sleeping better at nigh "       Objective: PTA repeated direction patient in TE program, modalities applied post treat,See treatment diary below  Assessment: Tolerated treatment well  Patient demonstrated fatigue post treatment and exhibited good technique with therapeutic exercises, soreness increased with PROM  Plan: Continue per plan of care  Precautions:  Follow protocol  Reassessment Due: 2020  Surgery Date: 2020        Date:      Visit # 1 2 3     Pain Level 4 3 3     Week post op Day 1                       manual        PROM R shoulder ------> KW KW     Jot mobs R S'                Exercise Diary         pendulumns x30 30x 30x     shrugs x30 30x 30x     retraction x30 30x 30x     Elbow wrist ROM x30 30x 30x     Gripper/ball squeeze x30 30x 30x                                                                                                                             Modalities        CP 15       IFC R shoulder -----> 15 15

## 2020-01-29 ENCOUNTER — OFFICE VISIT (OUTPATIENT)
Dept: PHYSICAL THERAPY | Facility: CLINIC | Age: 70
End: 2020-01-29
Payer: MEDICARE

## 2020-01-29 DIAGNOSIS — M25.511 ACUTE PAIN OF RIGHT SHOULDER: ICD-10-CM

## 2020-01-29 DIAGNOSIS — Z98.890 S/P ARTHROSCOPY OF RIGHT SHOULDER: ICD-10-CM

## 2020-01-29 DIAGNOSIS — M25.811 OTHER SPECIFIED JOINT DISORDERS, RIGHT SHOULDER: Primary | ICD-10-CM

## 2020-01-29 PROCEDURE — 97010 HOT OR COLD PACKS THERAPY: CPT

## 2020-01-29 PROCEDURE — 97110 THERAPEUTIC EXERCISES: CPT

## 2020-01-29 PROCEDURE — 97140 MANUAL THERAPY 1/> REGIONS: CPT

## 2020-01-29 PROCEDURE — 97014 ELECTRIC STIMULATION THERAPY: CPT

## 2020-01-29 NOTE — PROGRESS NOTES
Daily Note     Today's date: 2020  Patient name: Marge French  : 1950  MRN: [id number]  Referring provider: Miguel Teresa MD  Dx:   Encounter Diagnosis     ICD-10-CM    1  Other specified joint disorders, right shoulder M25 811    2  S/P arthroscopy of right shoulder Z98 890    3  Acute pain of right shoulder M25 511                   Subjective: Patient stated that if it wasn't for the stiffness in shoulder, she would "be fine " ADL's have been going "ok," the only difficulty is with tieing her shoes  She returns to surgeon tomorrow, 2020  Objective: PTA directed patient in TE program, See treatment diary below  IFC/Cp post gentle PROM  Assessment: Tolerated treatment well  Patient demonstrated fatigue post treatment and exhibited good technique with therapeutic exercises, no increase in shoulder pain with TE  Plan: Continue per plan of care  Precautions:  Follow protocol  Reassessment Due: 2020  Surgery Date: 2020        Date:     Visit # 1 2 3 4    Pain Level 4 3 3 1    Week post op Day 1                       manual        PROM R shoulder ------> KW KW KW    Jot mobs R S'                Exercise Diary         pendulumns x30 30x 30x 30x     shrugs x30 30x 30x 30x    retraction x30 30x 30x 30x    Elbow wrist ROM x30 30x 30x 30x     Gripper/ball squeeze x30 30x 30x 30x                                                                                                                            Modalities        CP 15       IFC R shoulder -----> 15 15 15

## 2020-02-03 ENCOUNTER — OFFICE VISIT (OUTPATIENT)
Dept: PHYSICAL THERAPY | Facility: CLINIC | Age: 70
End: 2020-02-03
Payer: MEDICARE

## 2020-02-03 DIAGNOSIS — M25.811 OTHER SPECIFIED JOINT DISORDERS, RIGHT SHOULDER: Primary | ICD-10-CM

## 2020-02-03 DIAGNOSIS — Z98.890 S/P ARTHROSCOPY OF RIGHT SHOULDER: ICD-10-CM

## 2020-02-03 PROCEDURE — 97110 THERAPEUTIC EXERCISES: CPT

## 2020-02-03 PROCEDURE — 97014 ELECTRIC STIMULATION THERAPY: CPT

## 2020-02-03 PROCEDURE — 97010 HOT OR COLD PACKS THERAPY: CPT

## 2020-02-03 PROCEDURE — 97140 MANUAL THERAPY 1/> REGIONS: CPT

## 2020-02-03 NOTE — PROGRESS NOTES
Daily Note     Today's date: 2/3/2020  Patient name: Abigail Monae  : 1950  MRN: [id number]  Referring provider: Essie Slaughter MD  Dx:   Encounter Diagnosis     ICD-10-CM    1  Other specified joint disorders, right shoulder M25 811    2  S/P arthroscopy of right shoulder Z98 890        Start Time: 0830  Stop Time: 920  Total time in clinic (min): 50 minutes    Subjective: Patient reported continued soreness in shoulder, however, she has had a headache/neck pain, 5/10,  which started on Saturday and has not subsided  She is unable to turn head to the R        Objective: PTA administered HP to cervical pre treat to decrease pain to allow more ability to perform TE program, See treatment diary below  PTA performed suboccipital release post PROM to R shoulder  Assessment: Tolerated treatment fair  Patient experienced no change in pain in cervical post suboccipital release  Pain persisted with shoulder TE program, able to tolerate and complete  Plan: Continue per plan of care  Precautions:  Follow protocol  Reassessment Due: 2020  Surgery Date: 2020        Date:  2/3   Visit # 1 2 3 4 5   Pain Level 4 3 3 1 1   Week post op Day 1                       manual        PROM R shoulder ------> KW KW KW KW   Jot mobs R S'                Exercise Diary         pendulumns x30 30x 30x 30x  30x    shrugs x30 30x 30x 30x 30x   retraction x30 30x 30x 30x 30x   Elbow wrist ROM x30 30x 30x 30x  30x   Gripper/ball squeeze x30 30x 30x 30x 30x                                                                                                                           Modalities        CP 15       IFC R shoulder -----> 15 15 15 15

## 2020-02-05 ENCOUNTER — OFFICE VISIT (OUTPATIENT)
Dept: PHYSICAL THERAPY | Facility: CLINIC | Age: 70
End: 2020-02-05
Payer: MEDICARE

## 2020-02-05 DIAGNOSIS — M25.811 OTHER SPECIFIED JOINT DISORDERS, RIGHT SHOULDER: Primary | ICD-10-CM

## 2020-02-05 DIAGNOSIS — Z98.890 S/P ARTHROSCOPY OF RIGHT SHOULDER: ICD-10-CM

## 2020-02-05 DIAGNOSIS — M25.511 ACUTE PAIN OF RIGHT SHOULDER: ICD-10-CM

## 2020-02-05 PROCEDURE — 97140 MANUAL THERAPY 1/> REGIONS: CPT | Performed by: PHYSICAL THERAPIST

## 2020-02-05 PROCEDURE — 97110 THERAPEUTIC EXERCISES: CPT | Performed by: PHYSICAL THERAPIST

## 2020-02-05 PROCEDURE — 97014 ELECTRIC STIMULATION THERAPY: CPT | Performed by: PHYSICAL THERAPIST

## 2020-02-05 NOTE — PROGRESS NOTES
Daily Note     Today's date: 2020  Patient name: Abigail Monae  : 1950  MRN: [id number]  Referring provider: Essie Slaughter MD  Dx:   Encounter Diagnosis     ICD-10-CM    1  Other specified joint disorders, right shoulder M25 811    2  S/P arthroscopy of right shoulder Z98 890    3  Acute pain of right shoulder M25 511                   Subjective: Pt states she saw Dr Britni Ernst on Monday who prescribed an antibiotic due to neck jaw and ear pain  Pt notes swallowing is better and neck is better  Objective: See treatment diary below  PROM supine flex 140, abd 110, ER 50, IR 50      Assessment: Tolerated treatment well  Patient tolerated stretching well with increased pain at end range  Pt's PROM progressing well meeting goals of protocol  No increase in pain with TE and stretching  Added 2lb weight to wrist TE with no complaint  Plan: Continue per plan of care  Precautions:  Follow protocol  Reassessment Due: 2020  Surgery Date: 2020        Date:  2/3   Visit # 6 2 3 4 5   Pain Level 0 3 3 1 1   Week post op 2 5                       manual        PROM R shoulder [facility] RANDI KW KW KW KW   Jot mobjack [facility] RANDI               Exercise Diary         pendulumns x30 30x 30x 30x  30x    shrugs x30 30x 30x 30x 30x   retraction x30 30x 30x 30x 30x   Elbow wrist ROM 2#x30 30x 30x 30x  30x   Gripper/ball squeeze x30 30x 30x 30x 30x                                                                                                                           Modalities        CP 15       IFC R shoulder 15 15 15 15 15

## 2020-02-10 ENCOUNTER — OFFICE VISIT (OUTPATIENT)
Dept: PHYSICAL THERAPY | Facility: CLINIC | Age: 70
End: 2020-02-10
Payer: MEDICARE

## 2020-02-10 DIAGNOSIS — M25.811 OTHER SPECIFIED JOINT DISORDERS, RIGHT SHOULDER: Primary | ICD-10-CM

## 2020-02-10 DIAGNOSIS — Z98.890 S/P ARTHROSCOPY OF RIGHT SHOULDER: ICD-10-CM

## 2020-02-10 DIAGNOSIS — M25.511 ACUTE PAIN OF RIGHT SHOULDER: ICD-10-CM

## 2020-02-10 PROCEDURE — 97014 ELECTRIC STIMULATION THERAPY: CPT

## 2020-02-10 PROCEDURE — 97110 THERAPEUTIC EXERCISES: CPT

## 2020-02-10 PROCEDURE — 97010 HOT OR COLD PACKS THERAPY: CPT

## 2020-02-10 PROCEDURE — 97140 MANUAL THERAPY 1/> REGIONS: CPT

## 2020-02-10 NOTE — PROGRESS NOTES
Daily Note     Today's date: 2/10/2020  Patient name: Reji Ferrara  : 1950  MRN: [id number]  Referring provider: Merle Atkinson MD  Dx:   Encounter Diagnosis     ICD-10-CM    1  Other specified joint disorders, right shoulder M25 811    2  S/P arthroscopy of right shoulder Z98 890    3  Acute pain of right shoulder M25 511        Start Time: 08  Stop Time: 930  Total time in clinic (min): 60 minutes    Subjective: Patient reported no pain in shoulder, just "a nagging ache "      Objective: PTA directed patient in Te program, See treatment diary below  IFC/CP applied to R shoulder in seated following PROM  Assessment: Tolerated treatment fair  Patient noted difficulty with Red hand  Te, as well as "pinching" in R bicep with same  Plan: Continue per plan of care  Precautions:  Follow protocol  Reassessment Due: 2020  Surgery Date: 2020        Date: 2/5 2/10 1/27 1/29 2/3   Visit # 6 7 3 4 5   Pain Level 0 0 3 1 1   Week post op 2 5                       manual        PROM R shoulder [facility] RANDI KW KW KW KW   Jot mobs [facility] RANDI               Exercise Diary         pendulumns x30 30x 30x 30x  30x    shrugs x30 30x 30x 30x 30x   retraction x30 30x 30x 30x 30x   Elbow wrist ROM 2#x30 2# 30x 30x 30x  30x   Gripper/ball squeeze x30 30x 30x 30x 30x                                                                                                                           Modalities        CP 15       IFC R shoulder 15 15 15 15 15

## 2020-02-12 ENCOUNTER — OFFICE VISIT (OUTPATIENT)
Dept: PHYSICAL THERAPY | Facility: CLINIC | Age: 70
End: 2020-02-12
Payer: MEDICARE

## 2020-02-12 DIAGNOSIS — M25.511 ACUTE PAIN OF RIGHT SHOULDER: ICD-10-CM

## 2020-02-12 DIAGNOSIS — Z98.890 S/P ARTHROSCOPY OF RIGHT SHOULDER: ICD-10-CM

## 2020-02-12 DIAGNOSIS — M25.811 OTHER SPECIFIED JOINT DISORDERS, RIGHT SHOULDER: Primary | ICD-10-CM

## 2020-02-12 PROCEDURE — 97140 MANUAL THERAPY 1/> REGIONS: CPT

## 2020-02-12 PROCEDURE — 97014 ELECTRIC STIMULATION THERAPY: CPT

## 2020-02-12 PROCEDURE — 97110 THERAPEUTIC EXERCISES: CPT

## 2020-02-12 NOTE — PROGRESS NOTES
Daily Note     Today's date: 2020  Patient name: Natalie Jay  : 1950  MRN: [id number]  Referring provider: Micheal Cordoba MD  Dx:   Encounter Diagnosis     ICD-10-CM    1  Other specified joint disorders, right shoulder M25 811    2  S/P arthroscopy of right shoulder Z98 890    3  Acute pain of right shoulder M25 511        Start Time: 0830  Stop Time: 920  Total time in clinic (min): 50 minutes    Subjective: Pt reports less pain at this time  She has her MD F/U  3/16 2020  Objective: See treatment diary below  PROM per protocol  Assessment: Tolerated treatment well  Patient exhibited good technique with therapeutic exercises      Plan: Continue per plan of care  Precautions:  Follow protocol  Reassessment Due: 2020  Surgery Date: 2020        Date: 2/5 2/10 2/12 1/29 2/3   Visit # 6 7 8 4 5   Pain Level 0 0 1 1 1   Week post op 2 5       manual        PROM R shoulder [facility] KW ds KW KW   Jot mobs R S' [facility]  ds             Exercise Diary         pendulumns x30 30x 30x 30x  30x    shrugs x30 30x 30x 30x 30x   retraction x30 30x 30x 30x 30x   Elbow wrist ROM 2#x30 2# 30x 2# 30x 30x  30x   Gripper/ball squeeze x30 30x 30x 30x 30x                                                                                           Modalities        CP 15  15     IFC R shoulder 15 15 15 15 15

## 2020-02-17 ENCOUNTER — OFFICE VISIT (OUTPATIENT)
Dept: PHYSICAL THERAPY | Facility: CLINIC | Age: 70
End: 2020-02-17
Payer: MEDICARE

## 2020-02-17 DIAGNOSIS — Z98.890 S/P ARTHROSCOPY OF RIGHT SHOULDER: ICD-10-CM

## 2020-02-17 DIAGNOSIS — M25.511 ACUTE PAIN OF RIGHT SHOULDER: ICD-10-CM

## 2020-02-17 DIAGNOSIS — M25.811 OTHER SPECIFIED JOINT DISORDERS, RIGHT SHOULDER: Primary | ICD-10-CM

## 2020-02-17 PROCEDURE — 97140 MANUAL THERAPY 1/> REGIONS: CPT

## 2020-02-17 PROCEDURE — 97014 ELECTRIC STIMULATION THERAPY: CPT

## 2020-02-17 PROCEDURE — 97110 THERAPEUTIC EXERCISES: CPT

## 2020-02-19 ENCOUNTER — EVALUATION (OUTPATIENT)
Dept: PHYSICAL THERAPY | Facility: CLINIC | Age: 70
End: 2020-02-19
Payer: MEDICARE

## 2020-02-19 ENCOUNTER — TRANSCRIBE ORDERS (OUTPATIENT)
Dept: PHYSICAL THERAPY | Facility: CLINIC | Age: 70
End: 2020-02-19

## 2020-02-19 DIAGNOSIS — M25.511 ACUTE PAIN OF RIGHT SHOULDER: ICD-10-CM

## 2020-02-19 DIAGNOSIS — M25.811 OTHER SPECIFIED JOINT DISORDERS, RIGHT SHOULDER: Primary | ICD-10-CM

## 2020-02-19 DIAGNOSIS — Z98.890 S/P ARTHROSCOPY OF RIGHT SHOULDER: ICD-10-CM

## 2020-02-19 PROCEDURE — 97110 THERAPEUTIC EXERCISES: CPT | Performed by: PHYSICAL THERAPIST

## 2020-02-19 PROCEDURE — 97140 MANUAL THERAPY 1/> REGIONS: CPT | Performed by: PHYSICAL THERAPIST

## 2020-02-19 PROCEDURE — 97014 ELECTRIC STIMULATION THERAPY: CPT | Performed by: PHYSICAL THERAPIST

## 2020-02-19 NOTE — LETTER
2020    Fabrizio Green MD  [facility]    Patient: Wilfredo Mccarty   YOB: 1950   Date of Visit: 2020     Encounter Diagnosis     ICD-10-CM    1  Other specified joint disorders, right shoulder M25 811    2  S/P arthroscopy of right shoulder Z98 890    3  Acute pain of right shoulder M25 511        Dear Dr Alpesh Izquierdo: Thank you for your recent referral of Wilfredo Mccarty  Please review the attached evaluation summary from Annalee's recent visit  Please verify that you agree with the plan of care by signing the attached order  If you have any questions or concerns, please do not hesitate to call  I sincerely appreciate the opportunity to share in the care of one of your patients and hope to have another opportunity to work with you in the near future  Sincerely,    Efrain James, PT      Referring Provider:      I certify that I have read the below Plan of Care and certify the need for these services furnished under this plan of treatment while under my care  Fabrizio Green MD  [facility]: [phone]          PT Re-Evaluation     Today's date: 2020  Patient name: Wilfredo Mccarty  : 1950  MRN: [id number]  Referring provider: Manish Lazo MD  Dx:   Encounter Diagnosis     ICD-10-CM    1  Other specified joint disorders, right shoulder M25 811    2  S/P arthroscopy of right shoulder Z98 890    3  Acute pain of right shoulder M25 511                   Assessment  Assessment details: Pt is a 81year old female s/p R shoulder arthroscopic surgery for rotator cuff repair, acromioplasty, labral debridement and coplaning of distal clavicle  Pt has been seen for 10 visits of Outpatient therapy with treatment consisting of PROM and joint mobilization R shoulder, AROM elbow wrist and hand with postural correction exercises and progress per rotator cuff protocol  Pt receives CP and IFC after TE and has been complaint with HEP  Pt is demonstrating good improvements in PROM R shoulder all planes and is now progressing to AAROM per protocol  Pt has been able to return to light activity at home and has returned to driving  Pt complains of minimal pain at R shoulder except with PROM stretching at end-range  Pt is still unable to sleep in bed and wakes nightly due to pain  Pt is in need of continued activity in PT with progression per protocol to improve pain ROM posture with progression to strengthening per protocol  Will increase treatment frequency to 3x/wk to address ROM deficits now that pt is allowed to perform active and AAROM  Impairments: abnormal or restricted ROM, activity intolerance, impaired physical strength, lacks appropriate home exercise program, pain with function and weight-bearing intolerance  Functional limitations: all functional use of R UE, pt has been restricted per protocolUnderstanding of Dx/Px/POC: good   Prognosis: good    Goals  STG's to be met in 3-4 weeks  1  Decrease R shoulder pain to 3-4/10 at rest- met  2  Improve PROM R shoulder to flex at least 120, ER 70 degrees IR to 35 degrees per protocol- partially met  3  Pt will be able to don and doff sling Independently- met  4  Pt will be (I) with HEP- met  5  Pt will be able to sleep through night without waking- not met    LTG's to be met in 10-12 weeks:  1  Decrease R shoulder pain with activity/movement to 0-2/10- met but activity still restricted  2  Improve R shoulder AROM to [facility] all motions needed for ADL's- not met  3  Pt will demonstrate R shoulder strength within 3-5lbs of L- not met  4  Pt will perform all ADL's and dressing without modification of technique- not met  5  Pt will be able to reach to and above shoulder level without difficulty- not met  6  Pt will be able to lift 10lbs to or above shoulder level- not met  7  Pt will return to driving- met  8   Improve Foto score to at least 55- not met 40        Plan  Patient would benefit from: skilled physical therapy  Planned modality interventions: cryotherapy and unattended electrical stimulation  Other planned modality interventions: modalities to be added or modified at discretion of therapist  Planned therapy interventions: joint mobilization, manual therapy, neuromuscular re-education, patient education, postural training, therapeutic exercise, stretching, home exercise program and self care  Frequency: 3x week  Duration in weeks: 4  Plan of Care beginning date: 2020  Plan of Care expiration date: 2020  Treatment plan discussed with: patient and PTA        Subjective Evaluation    History of Present Illness  Date of onset: 2019  Date of surgery: 2020  Mechanism of injury: Pt states she has taken the sling off now that she is 4 weeks  Pt notes showing and ADL's especially dressing is improving  Pain is decreasing except at night shoulder gets ache  Pt notes she has been able to return to light housekeeping and walking her dog  Pt has been keeping R UE in sling until this point  Not a recurrent problem   Quality of life: good    Pain  Current pain ratin  At best pain ratin  At worst pain ratin  Location: R shoulder  Quality: dull ache  Relieving factors: ice  Progression: improved    Social Support  Patient lives at: [place]      Treatments  Previous treatment: physical therapy and injection treatment  Current treatment: medication and physical therapy  Patient Goals  Patient goals for therapy: decreased pain, increased motion, increased strength, independence with ADLs/IADLs and return to sport/leisure activities  Patient goal: being able to dress myself- met but with modification        Objective     Cervical/Thoracic Screen   Cervical range of motion within normal limits    Neurological Testing     Sensation     Shoulder   Left Shoulder   Intact: light touch    Right Shoulder   Intact: light touch    Additional Neurological Details  Sensation intact    Active Range of Motion     Right Shoulder   Flexion: 85 degrees   Abduction: 53 degrees   External rotation 90°:  57 degrees  External rotation BTH: C3   Internal rotation 90°:  46 degrees   Internal rotation BTB: sacrum     Additional Active Range of Motion Details    Elbow flex: AROM WFL        Passive Range of Motion     Right Shoulder   Flexion: 150 degrees   Abduction: 127 degrees   External rotation 90°:  65 degrees   Internal rotation 90°:  53 degrees     Additional Passive Range of Motion Details  PROM assessed supine    Strength/Myotome Testing     Additional Strength Details  Shoulder                  R                 L  Flex                     NT              21 lbs  Abd                     NT              19 lbs  IR                        NT              19 lbs  ER                      NT               19 lbs    Elbow  Flex                     12 lbs            17 lbs  Ext                       15 lbs           22 lbs    General Comments:      Shoulder Comments   Pt unable to use R arm due to arm in sling x 4 weeks per rotator cuff protocol  Foto Score 4  Still unable to do hair and perform self care with R UE, restricted per protocol  Unable to perform reaching behind back or behind head   Pt has returned to driving short distance  Pt unable to use R UE for lifting or carrying  Pt still sleeping in recliner and waking nightly with inability to lay on R side  Precautions:  Follow protocol  Reassessment Due: 3/18/2020  Surgery Date: 1/21/2020        Date: 2/5 2/10 2/12 2/17 2/19   Visit # 6 7 8 9 10   Pain Level 0 0 1 1 0-1   Week post op 2 5 3 3 5 4 4 5   manual        PROM R shoulder JH KW ds PORFIRIO Kothari mobs [facility]           Exercise Diary         pendulumns x30 30x 30x 30x x30   shrugs x30 30x 30x 30x  x30   retraction x30 30x 30x 30x x30   Elbow wrist ROM 2#x30 2# 30x 2# 30x 2# 30x 2# x30   Gripper/ball squeeze x30 30x 30x 30x x30   Isometrics all planes     X 10 5" hold   Supine cane flex        UBE     2m L1 3 min L1   Pulleys     2 min  2 min   Cane abd, ext        T-band bicep     L1 2/10 L1 2/10   T-band tricep    L1 2/10 L1 2/10   Table slides    2/10 2/10                           Modalities        CP 15  15  15   IFC R shoulder 15 15 15 15 15

## 2020-02-19 NOTE — PROGRESS NOTES
PT Re-Evaluation     Today's date: 2020  Patient name: Alejandro Weinstein  : 1950  MRN: [id number]  Referring provider: Edinson Camp MD  Dx:   Encounter Diagnosis     ICD-10-CM    1  Other specified joint disorders, right shoulder M25 811    2  S/P arthroscopy of right shoulder Z98 890    3  Acute pain of right shoulder M25 511                   Assessment  Assessment details: Pt is a 81year old female s/p R shoulder arthroscopic surgery for rotator cuff repair, acromioplasty, labral debridement and coplaning of distal clavicle  Pt has been seen for 10 visits of Outpatient therapy with treatment consisting of PROM and joint mobilization R shoulder, AROM elbow wrist and hand with postural correction exercises and progress per rotator cuff protocol  Pt receives CP and IFC after TE and has been complaint with HEP  Pt is demonstrating good improvements in PROM R shoulder all planes and is now progressing to AAROM per protocol  Pt has been able to return to light activity at home and has returned to driving  Pt complains of minimal pain at R shoulder except with PROM stretching at end-range  Pt is still unable to sleep in bed and wakes nightly due to pain  Pt is in need of continued activity in PT with progression per protocol to improve pain ROM posture with progression to strengthening per protocol  Will increase treatment frequency to 3x/wk to address ROM deficits now that pt is allowed to perform active and AAROM  Impairments: abnormal or restricted ROM, activity intolerance, impaired physical strength, lacks appropriate home exercise program, pain with function and weight-bearing intolerance  Functional limitations: all functional use of R UE, pt has been restricted per protocolUnderstanding of Dx/Px/POC: good   Prognosis: good    Goals  STG's to be met in 3-4 weeks  1  Decrease R shoulder pain to 3-4/10 at rest- met  2   Improve PROM R shoulder to flex at least 120, ER 70 degrees IR to 35 degrees per protocol- partially met  3  Pt will be able to don and doff sling Independently- met  4  Pt will be (I) with HEP- met  5  Pt will be able to sleep through night without waking- not met    LTG's to be met in 10-12 weeks:  1  Decrease R shoulder pain with activity/movement to 0-2/10- met but activity still restricted  2  Improve R shoulder AROM to [facility] all motions needed for ADL's- not met  3  Pt will demonstrate R shoulder strength within 3-5lbs of L- not met  4  Pt will perform all ADL's and dressing without modification of technique- not met  5  Pt will be able to reach to and above shoulder level without difficulty- not met  6  Pt will be able to lift 10lbs to or above shoulder level- not met  7  Pt will return to driving- met  8  Improve Foto score to at least 55- not met 40        Plan  Patient would benefit from: skilled physical therapy  Planned modality interventions: cryotherapy and unattended electrical stimulation  Other planned modality interventions: modalities to be added or modified at discretion of therapist  Planned therapy interventions: joint mobilization, manual therapy, neuromuscular re-education, patient education, postural training, therapeutic exercise, stretching, home exercise program and self care  Frequency: 3x week  Duration in weeks: 4  Plan of Care beginning date: 2/19/2020  Plan of Care expiration date: 4/19/2020  Treatment plan discussed with: patient and PTA        Subjective Evaluation    History of Present Illness  Date of onset: 9/9/2019  Date of surgery: 1/21/2020  Mechanism of injury: Pt states she has taken the sling off now that she is 4 weeks  Pt notes showing and ADL's especially dressing is improving  Pain is decreasing except at night shoulder gets ache  Pt notes she has been able to return to light housekeeping and walking her dog  Pt has been keeping R UE in sling until this point             Not a recurrent problem   Quality of life: good    Pain  Current pain rating: 1  At best pain ratin  At worst pain ratin  Location: R shoulder  Quality: dull ache  Relieving factors: ice  Progression: improved    Social Support  Patient lives at: [place]  Treatments  Previous treatment: physical therapy and injection treatment  Current treatment: medication and physical therapy  Patient Goals  Patient goals for therapy: decreased pain, increased motion, increased strength, independence with ADLs/IADLs and return to sport/leisure activities  Patient goal: being able to dress myself- met but with modification        Objective     Cervical/Thoracic Screen   Cervical range of motion within normal limits    Neurological Testing     Sensation     Shoulder   Left Shoulder   Intact: light touch    Right Shoulder   Intact: light touch    Additional Neurological Details  Sensation intact    Active Range of Motion     Right Shoulder   Flexion: 85 degrees   Abduction: 53 degrees   External rotation 90°: 57 degrees  External rotation BTH: C3   Internal rotation 90°: 46 degrees   Internal rotation BTB: sacrum     Additional Active Range of Motion Details    Elbow flex: AROM WFL        Passive Range of Motion     Right Shoulder   Flexion: 150 degrees   Abduction: 127 degrees   External rotation 90°: 65 degrees   Internal rotation 90°: 53 degrees     Additional Passive Range of Motion Details  PROM assessed supine    Strength/Myotome Testing     Additional Strength Details  Shoulder                  R                 L  Flex                     NT              21 lbs  Abd                     NT              19 lbs  IR                        NT              19 lbs  ER                      NT               19 lbs    Elbow  Flex                     12 lbs            17 lbs  Ext                       15 lbs           22 lbs    General Comments:      Shoulder Comments   Pt unable to use R arm due to arm in sling x 4 weeks per rotator cuff protocol      Foto Score 4  Still unable to do hair and perform self care with R UE, restricted per protocol  Unable to perform reaching behind back or behind head   Pt has returned to driving short distance  Pt unable to use R UE for lifting or carrying  Pt still sleeping in recliner and waking nightly with inability to lay on R side  Precautions:  Follow protocol  Reassessment Due: 3/18/2020  Surgery Date: 1/21/2020        Date: 2/5 2/10 2/12 2/17 2/19   Visit # 6 7 8 9 10   Pain Level 0 0 1 1 0-1   Week post op 2 5 3 3 5 4 4 5   manual        PROM R shoulder JH KW ds KW JH   Jot mobs R S' [facility] RANDI  [facility] RANDI           Exercise Diary         pendulumns x30 30x 30x 30x x30   shrugs x30 30x 30x 30x  x30   retraction x30 30x 30x 30x x30   Elbow wrist ROM 2#x30 2# 30x 2# 30x 2# 30x 2# x30   Gripper/ball squeeze x30 30x 30x 30x x30   Isometrics all planes     X 10 5" hold   Supine cane flex        UBE     2m L1 3 min L1   Pulleys     2 min  2 min   Cane abd, ext        T-band bicep     L1 2/10 L1 2/10   T-band tricep    L1 2/10 L1 2/10   Table slides    2/10 2/10                           Modalities        CP 15  15  15   IFC R shoulder 15 15 15 15 15

## 2020-02-21 ENCOUNTER — APPOINTMENT (OUTPATIENT)
Dept: PHYSICAL THERAPY | Facility: CLINIC | Age: 70
End: 2020-02-21
Payer: MEDICARE

## 2020-02-24 ENCOUNTER — OFFICE VISIT (OUTPATIENT)
Dept: PHYSICAL THERAPY | Facility: CLINIC | Age: 70
End: 2020-02-24
Payer: MEDICARE

## 2020-02-24 DIAGNOSIS — Z98.890 S/P ARTHROSCOPY OF RIGHT SHOULDER: ICD-10-CM

## 2020-02-24 DIAGNOSIS — M25.511 ACUTE PAIN OF RIGHT SHOULDER: ICD-10-CM

## 2020-02-24 DIAGNOSIS — M25.811 OTHER SPECIFIED JOINT DISORDERS, RIGHT SHOULDER: Primary | ICD-10-CM

## 2020-02-24 PROCEDURE — 97140 MANUAL THERAPY 1/> REGIONS: CPT

## 2020-02-24 PROCEDURE — 97010 HOT OR COLD PACKS THERAPY: CPT

## 2020-02-24 PROCEDURE — 97014 ELECTRIC STIMULATION THERAPY: CPT

## 2020-02-24 PROCEDURE — 97110 THERAPEUTIC EXERCISES: CPT

## 2020-02-24 NOTE — PROGRESS NOTES
Daily Note     Today's date: 2020  Patient name: JoseA rmando Salas  : 1950  MRN: [id number]  Referring provider: Barbara Friedman MD  Dx:   Encounter Diagnosis     ICD-10-CM    1  Other specified joint disorders, right shoulder M25 811    2  S/P arthroscopy of right shoulder Z98 890    3  Acute pain of right shoulder M25 511        Start Time: 0830  Stop Time: 0930  Total time in clinic (min): 60 minutes    Subjective: Patient stated a little more ease with ADL's  Achiness noted in shoulder at this time  Objective: PTA directed patient in UE strengthening and ROM program, See treatment diary below  Assessment: Tolerated treatment well  Patient noted the aching in shoulder remained post treat  Plan: Continue per plan of care  Precautions:  Follow protocol  Reassessment Due: 3/18/2020  Surgery Date: 2020        Date: 2/24 2/10 2/12 2/17 2/19   Visit # 10 7 8 9 10   Pain Level 0 0 1 1 0-1   Week post op  3 3 5 4 4 5   manual        PROM R shoulder KW KW ds KW    Jot mobs R S'   ds  [facility] RANDI           Exercise Diary         pendulumns 30x  30x 30x 30x x30   shrugs 30x 30x 30x 30x  x30   retraction 30x 30x 30x 30x x30   Elbow wrist ROM 2# 30x 2# 30x 2# 30x 2# 30x 2# x30   Gripper/ball squeeze RED 30x  30x 30x 30x x30   Isometrics all planes 10x  5" hold    X 10 5" hold   Supine cane flex        UBE  3m L1    2m L1 3 min L1   Pulleys  2 min    2 min  2 min   Cane abd, ext        T-band bicep  L2 2/10   L1 2/10 L1 2/10   T-band tricep L1 2/10   L1 2/10 L1 2/10   Table slides 2/10   2/10 2/10                           Modalities        CP   15  15   IFC R shoulder  15 15 15 15

## 2020-02-26 ENCOUNTER — OFFICE VISIT (OUTPATIENT)
Dept: PHYSICAL THERAPY | Facility: CLINIC | Age: 70
End: 2020-02-26
Payer: MEDICARE

## 2020-02-26 DIAGNOSIS — Z98.890 S/P ARTHROSCOPY OF RIGHT SHOULDER: ICD-10-CM

## 2020-02-26 DIAGNOSIS — M25.811 OTHER SPECIFIED JOINT DISORDERS, RIGHT SHOULDER: Primary | ICD-10-CM

## 2020-02-26 DIAGNOSIS — M25.511 ACUTE PAIN OF RIGHT SHOULDER: ICD-10-CM

## 2020-02-26 PROCEDURE — 97140 MANUAL THERAPY 1/> REGIONS: CPT | Performed by: PHYSICAL THERAPIST

## 2020-02-26 PROCEDURE — 97014 ELECTRIC STIMULATION THERAPY: CPT | Performed by: PHYSICAL THERAPIST

## 2020-02-26 PROCEDURE — 97112 NEUROMUSCULAR REEDUCATION: CPT | Performed by: PHYSICAL THERAPIST

## 2020-02-26 PROCEDURE — 97110 THERAPEUTIC EXERCISES: CPT | Performed by: PHYSICAL THERAPIST

## 2020-02-26 NOTE — PROGRESS NOTES
Daily Note     Today's date: 2020  Patient name: Yessi Mcclain  : 1950  MRN: [id number]  Referring provider: Kesha Estrada MD  Dx:   Encounter Diagnosis     ICD-10-CM    1  Other specified joint disorders, right shoulder M25 811    2  S/P arthroscopy of right shoulder Z98 890    3  Acute pain of right shoulder M25 511                   Subjective: Pt notes shoulder is ache today which she attributes to the weather  Pain is a 2  Objective: See treatment diary below  See grid for progression per protocol  Assessment: Tolerated treatment well  Patient still with limitation in PROM in all planes with pain at end range however PROM continues to progress  Pt tolerated progression of active assistive ROM well  AAROM flex to 90 degrees with cane, 105 with therapist assist with upward rotation of scapula  Plan: Continue per plan of care  Precautions:  Follow protocol  Reassessment Due: 3/18/2020  Surgery Date: 2020        Date:    Visit # 10 11 8 9 10   Pain Level 0 2 1 1 0-1   Week post op  5 5 3 5 4 4 5   manual        PROM R shoulder KW [facility] ds K[facility]   Jot mobs Kathrin David'  [facility] ds  [facility]           Exercise Diary         pendulumns 30x  30x 30x 30x x30   shrugs 30x 30x 30x 30x  x30   retraction 30x 30x 30x 30x x30   Elbow wrist ROM 2# 30x 2# 30x 2# 30x 2# 30x 2# x30   Gripper/ball squeeze RED 30x  D/C 30x 30x x30   Isometrics all planes 10x  5" hold x10 5 sec hold   X 10 5" hold   Supine cane flex  x10      UBE  3m L1  4 min L2  2m L1 3 min L1   Pulleys  2 min  2 min  2 min  2 min   Cane abd, ext  x10      T-band bicep  L2 2/10 L2 2/10  L1 2/10 L1 2/10   T-band tricep L1 2/10 L2 2/10  L1 2/10 L1 2/10   Table slides 2/10 2/10  2/10 2/10   AAROM flex/abd  x10 ea      Standing cane flex  x10ea              Modalities        CP 15 15 15  15   IFC R shoulder 15 15 15 15 15

## 2020-02-28 ENCOUNTER — OFFICE VISIT (OUTPATIENT)
Dept: PHYSICAL THERAPY | Facility: CLINIC | Age: 70
End: 2020-02-28
Payer: MEDICARE

## 2020-02-28 DIAGNOSIS — M25.811 OTHER SPECIFIED JOINT DISORDERS, RIGHT SHOULDER: Primary | ICD-10-CM

## 2020-02-28 DIAGNOSIS — M25.511 ACUTE PAIN OF RIGHT SHOULDER: ICD-10-CM

## 2020-02-28 DIAGNOSIS — Z98.890 S/P ARTHROSCOPY OF RIGHT SHOULDER: ICD-10-CM

## 2020-02-28 PROCEDURE — 97140 MANUAL THERAPY 1/> REGIONS: CPT | Performed by: PHYSICAL THERAPIST

## 2020-02-28 PROCEDURE — 97112 NEUROMUSCULAR REEDUCATION: CPT | Performed by: PHYSICAL THERAPIST

## 2020-02-28 PROCEDURE — 97110 THERAPEUTIC EXERCISES: CPT | Performed by: PHYSICAL THERAPIST

## 2020-02-28 PROCEDURE — 97014 ELECTRIC STIMULATION THERAPY: CPT | Performed by: PHYSICAL THERAPIST

## 2020-02-28 NOTE — PROGRESS NOTES
Daily Note     Today's date: 2020  Patient name: Dayana John  : 1950  MRN: [id number]  Referring provider: Luz Cohn MD  Dx:   Encounter Diagnosis     ICD-10-CM    1  Other specified joint disorders, right shoulder M25 811    2  S/P arthroscopy of right shoulder Z98 890    3  Acute pain of right shoulder M25 511                   Subjective: Pt states she slept better last night and did not wake up until 4  Pt notes pain is minimal today rated less than 1/10  Objective: See treatment diary below  Instructed pt is sleeper stretch for home to improve IR ROM  Assessment: Tolerated treatment well  Patient demonstrating good improvements in PROM in all planes with most limitation into IR  Pt demonstrating improved ability to perform flex and abd with cane  Soreness and fatigue noted after TE  Plan: Continue per plan of care  Precautions:  Follow protocol  Reassessment Due: 3/18/2020  Surgery Date: 2020        Date:    Visit # 10 11 12 9 10   Pain Level 0 2 0 5 1 0-1   Week post op  5 5 6 4 4 5   manual        PROM R shoulder KW [facility] KW [facility]   Jot Doreatha Jose'  [facility]           Exercise Diary         pendulumns 30x  30x 30x 30x x30   shrugs 30x 30x 30x 30x  x30   retraction 30x 30x 30x 30x x30   Elbow wrist ROM 2# 30x 2# 30x 2# 30x 2# 30x 2# x30   Gripper/ball squeeze RED 30x  D/C D/C 30x x30   Isometrics all planes 10x  5" hold x10 5 sec hold x10 5sec hold  X 10 5" hold   Supine cane flex  x10 x10     UBE  3m L1  4 min L2 4 min L3 2m L1 3 min L1   Pulleys  2 min  2 min 2 min 2 min  2 min   Cane abd, ext  x10 x10     T-band bicep  L2 2/10 L2 2/10 L2 2/10 L1 2/10 L1 2/10   T-band tricep L1 2/10 L2 2/10 L2 2/10 L1 2/10 L1 2/10   Table slides 2/10 2/10 2/10 2/10 2/10   AAROM flex/abd  x10 ea x10     Standing cane flex  x10ea x10ea             Modalities        CP 15 15 15  15   IFC R shoulder 15 15 15 15 15

## 2020-03-02 ENCOUNTER — OFFICE VISIT (OUTPATIENT)
Dept: PHYSICAL THERAPY | Facility: CLINIC | Age: 70
End: 2020-03-02
Payer: MEDICARE

## 2020-03-02 DIAGNOSIS — M25.511 ACUTE PAIN OF RIGHT SHOULDER: ICD-10-CM

## 2020-03-02 DIAGNOSIS — M25.811 OTHER SPECIFIED JOINT DISORDERS, RIGHT SHOULDER: Primary | ICD-10-CM

## 2020-03-02 DIAGNOSIS — Z98.890 S/P ARTHROSCOPY OF RIGHT SHOULDER: ICD-10-CM

## 2020-03-02 PROCEDURE — 97140 MANUAL THERAPY 1/> REGIONS: CPT

## 2020-03-02 PROCEDURE — 97110 THERAPEUTIC EXERCISES: CPT

## 2020-03-02 PROCEDURE — 97112 NEUROMUSCULAR REEDUCATION: CPT

## 2020-03-02 PROCEDURE — 97014 ELECTRIC STIMULATION THERAPY: CPT

## 2020-03-02 NOTE — PROGRESS NOTES
Daily Note     Today's date: 3/2/2020  Patient name: Ivy Obrien  : 1950  MRN: [id number]  Referring provider: Hill Perez MD  Dx:   Encounter Diagnosis     ICD-10-CM    1  Other specified joint disorders, right shoulder M25 811    2  S/P arthroscopy of right shoulder Z98 890    3  Acute pain of right shoulder M25 511        Start Time: 0830  Stop Time: 0945  Total time in clinic (min): 75 minutes    Subjective:Pt notes continued stiffness of the R shoulder  Min pain noted today  Objective: See treatment diary below      Assessment: Tolerated treatment well  Patient exhibited good technique with therapeutic exercises      Plan: Continue per plan of care  Precautions:  Follow protocol  Reassessment Due: 3/18/2020  Surgery Date: 2020        Date: 2/24 2/26 2/28 3/2 2/19   Visit # 10 11 12 13 10   Pain Level 0 2 0 5  5 0-1   Week post op  5 5 6  4 5   manual        PROM R shoulder K[facility]   Jot mobs [employer]  [facility] RANDI [facility] ds [facility]           Exercise Diary         pendulumns 30x  30x 30x 30x x30   shrugs 30x 30x 30x 30x  x30   retraction 30x 30x 30x 30x x30   Elbow wrist ROM 2# 30x 2# 30x 2# 30x 2# 30x 2# x30   Gripper/ball squeeze RED 30x  D/C D/C dc x30   Isometrics all    planes 10x  5" hold x10 5 sec hold x10 5sec hold 10x 5" X 10 5" hold   Supine cane flex  x10 x10 2/10    UBE  3m L1  4 min L2 4 min L3 6m   L3 3 min L1   Pulleys  2 min  2 min 2 min 3 m  2 min   Cane abd, ext  x10 x10     T-band bicep  L2 2/10 L2 2/10 L2 2/10 L2 2/10 L1 2/10   T-band tricep L1 2/10 L2 2/10 L2 2/10 L2 2/10 L1 2/10   Table slides /10 2/10 2/10 2/10 2/10   AAROM flex/abd  x10 ea x10 10x    Standing cane flex  x10ea x10ea 10x ea            Modalities        CP 15 15 15 15 15   IFC R shoulder 15 15 15 15 15

## 2020-03-04 ENCOUNTER — OFFICE VISIT (OUTPATIENT)
Dept: PHYSICAL THERAPY | Facility: CLINIC | Age: 70
End: 2020-03-04
Payer: MEDICARE

## 2020-03-04 DIAGNOSIS — M25.511 ACUTE PAIN OF RIGHT SHOULDER: ICD-10-CM

## 2020-03-04 DIAGNOSIS — M25.811 OTHER SPECIFIED JOINT DISORDERS, RIGHT SHOULDER: Primary | ICD-10-CM

## 2020-03-04 DIAGNOSIS — Z98.890 S/P ARTHROSCOPY OF RIGHT SHOULDER: ICD-10-CM

## 2020-03-04 PROCEDURE — 97140 MANUAL THERAPY 1/> REGIONS: CPT | Performed by: PHYSICAL THERAPIST

## 2020-03-04 PROCEDURE — 97112 NEUROMUSCULAR REEDUCATION: CPT | Performed by: PHYSICAL THERAPIST

## 2020-03-04 PROCEDURE — 97110 THERAPEUTIC EXERCISES: CPT | Performed by: PHYSICAL THERAPIST

## 2020-03-04 PROCEDURE — 97014 ELECTRIC STIMULATION THERAPY: CPT | Performed by: PHYSICAL THERAPIST

## 2020-03-04 NOTE — PROGRESS NOTES
Daily Note     Today's date: 3/4/2020  Patient name: Erik Rodney  : 1950  MRN: [id number]  Referring provider: Florencio Greenfield MD  Dx:   Encounter Diagnosis     ICD-10-CM    1  Other specified joint disorders, right shoulder M25 811    2  S/P arthroscopy of right shoulder Z98 890    3  Acute pain of right shoulder M25 511                   Subjective: Pt notes she is a little more sore today than usual with pain rated 2/10  Objective: See treatment diary below  PROM R shoulder flex 157, abd 130, ER 72, IR- 50      Assessment: Tolerated treatment well  Patient with improvements in PROM all planes with most limitation into IR  Pt tolerated progression of TE well per protocol with increase in soreness only noted after PROM  Plan: Continue per plan of care  Progress treament per protocol  Precautions:  Follow protocol  Reassessment Due: 3/18/2020  Surgery Date: 2020        Date: 2/24 2/26 2/28 3/2 3/4   Visit # 10 11 12 13 14   Pain Level 0 2 0 5  5 2   Week post op  5 5 6  7   manual        PROM R shoulder KW [facility] [employer] ds    Jot [facility]'  [facility] [facility]           Exercise Diary         pendulumns 30x  30x 30x 30x 2#x30   shrugs 30x 30x 30x 30x  2#x30   retraction 30x 30x 30x 30x D/C   Elbow wrist ROM 2# 30x 2# 30x 2# 30x 2# 30x D/C   Gripper/ball squeeze RED 30x  D/C D/C dc D/C   Isometrics all    planes 10x  5" hold x10 5 sec hold x10 5sec hold 10x 5" D/C   Supine cane flex  x10 x10 2/10 2# 2/10   UBE  3m L1  4 min L2 4 min L3 6m   L3 6 min L3   Pulleys  2 min  2 min 2 min 3 m  3 min   Strap stretch IR     x10 10" hold   Cane abd, ext, IR  x10 x10  x10   Rhythmic stab supine     1 min   T-band row     L2 2/10   T-band ANNE     L2 2/10   T-band ER     L2 2/10   T-band IR     L2 2/10   T-band bicep  L2 2/10 L2 2/10 L2 2/10 L2 2/10 L2 2/10   T-band tricep L1 2/10 L2 2/10 L2 2/10 L2 2/10 L2 2/10   Table slides 10 210 10 2/10 Wall slide x10   AAROM flex/abd  x10 ea x10 10x AROM x10   Standing cane flex  x10ea x10ea 10x ea x10   SL ER, and abd     1# 2/10   Modalities        CP 15 15 15 15 15   IFC R shoulder 15 15 15 15 15

## 2020-03-06 ENCOUNTER — OFFICE VISIT (OUTPATIENT)
Dept: PHYSICAL THERAPY | Facility: CLINIC | Age: 70
End: 2020-03-06
Payer: MEDICARE

## 2020-03-06 DIAGNOSIS — Z98.890 S/P ARTHROSCOPY OF RIGHT SHOULDER: ICD-10-CM

## 2020-03-06 DIAGNOSIS — M25.511 ACUTE PAIN OF RIGHT SHOULDER: ICD-10-CM

## 2020-03-06 DIAGNOSIS — M25.811 OTHER SPECIFIED JOINT DISORDERS, RIGHT SHOULDER: Primary | ICD-10-CM

## 2020-03-06 PROCEDURE — 97140 MANUAL THERAPY 1/> REGIONS: CPT

## 2020-03-06 PROCEDURE — 97112 NEUROMUSCULAR REEDUCATION: CPT

## 2020-03-06 PROCEDURE — 97110 THERAPEUTIC EXERCISES: CPT

## 2020-03-06 PROCEDURE — 97014 ELECTRIC STIMULATION THERAPY: CPT

## 2020-03-06 NOTE — PROGRESS NOTES
Daily Note     Today's date: 3/6/2020  Patient name: Monica King  : 1950  MRN: [id number]  Referring provider: Rl Reich MD  Dx:   Encounter Diagnosis     ICD-10-CM    1  Other specified joint disorders, right shoulder M25 811    2  S/P arthroscopy of right shoulder Z98 890    3  Acute pain of right shoulder M25 511        Start Time: 0800  Stop Time: 0915  Total time in clinic (min): 75 minutes    Subjective:Pt reports slightly less discomfort today  Objective: See treatment diary below  Added bodyblade, trial Mulligan's for forward flexion/scaption  Assessment: Tolerated treatment well  Patient demonstrated fatigue post treatment      Plan: Continue per plan of care  Precautions:  Follow protocol  Reassessment Due: 3/18/2020  Surgery Date: 2020        Date: 3/6 2/26 2/28 3/2 3   Visit # 15 11 12 13 14   Pain Level  2 0 5  5 2   Week post op  5 5 6  7   manual        PROM R shoulder ds [facility] JH   Jot mobs R S' ds [facility]           Exercise Diary         pendulumns 2#30x  30x 30x 30x 2#x30   shrugs 2#30x 30x 30x 30x  2#x30   Supine cane flex 2# 2/10 x10 x10 2/10 2# 2/10   UBE  6m  L3 4 min L2 4 min L3 6m   L3 6 min L3   Pulleys  30x 2 min 2 min 3 m  3 min   Strap stretch IR 10x 10"    x10 10"    Cane abd, ext, IR 10x x10 x10  x10   Rhythmic stab supine 1m    1 min   T-band row L2  2/10    L2 2/10   T-band ANNE L2  2/10    L2 2/10   T-band ER L2  2/10    L2 2/10   T-band IR L2  2/10    L2 2/10   T-band bicep  L2 2/10 L2 2/10 L2 2/10 L2 2/10 L2 2/10   T-band tricep L2 2/10 L2 2/10 L2 2/10 L2 2/10 L2 2/10   Wall slides 2/10 2/10 2/10 2/10 Wall slide x10   AROM flex/abd 10x x10 ea x10 10x AROM x10   Standing cane flex 10x x10ea x10ea 10x ea x10   SL ER, and abd 1#  2/10    1# 2/10   Bodyblade 2pos 30"x2               Modalities        CP 15 15 15 15 15   IFC R shoulder 15 15 15 15 15

## 2020-03-09 ENCOUNTER — OFFICE VISIT (OUTPATIENT)
Dept: PHYSICAL THERAPY | Facility: CLINIC | Age: 70
End: 2020-03-09
Payer: MEDICARE

## 2020-03-09 DIAGNOSIS — M25.811 OTHER SPECIFIED JOINT DISORDERS, RIGHT SHOULDER: Primary | ICD-10-CM

## 2020-03-09 DIAGNOSIS — M25.511 ACUTE PAIN OF RIGHT SHOULDER: ICD-10-CM

## 2020-03-09 DIAGNOSIS — Z98.890 S/P ARTHROSCOPY OF RIGHT SHOULDER: ICD-10-CM

## 2020-03-09 PROCEDURE — 97014 ELECTRIC STIMULATION THERAPY: CPT

## 2020-03-09 PROCEDURE — 97140 MANUAL THERAPY 1/> REGIONS: CPT

## 2020-03-09 PROCEDURE — 97110 THERAPEUTIC EXERCISES: CPT

## 2020-03-09 NOTE — PROGRESS NOTES
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    2  S/P arthroscopy of right shoulder Z98 890    3  Acute pain of right shoulder M25 511        Start Time: 0830  Stop Time: 0930  Total time in clinic (min): 60 minutes    Subjective: Patient stated pain last night in shoulder down bicep and into neck  Objective: PTA directed patient in TE program, with warm up on UBE,See treatment diary below  Assessment: Tolerated treatment well  Patient noted discomfort with T-band ER and bicep curls  Decrease in shoulder discomfort following IFC/CP  Plan: Continue per plan of care  Precautions:  Follow protocol  Reassessment Due: 3/18/2020  Surgery Date: 2020        Date: 3/6 3/9 2/28 3/2 3/4   Visit # 15 16 12 13 14   Pain Level  2 0 5  5 2   Week post op   6  7   manual        PROM R shoulder ds  [facility] ds JH   Jot mobs R S' ds  [facility]on[facility] - [employer]  [facility]on[facility] - [employer]           Exercise Diary         pendulumns 2#30x  2# 30x 30x 30x 2#x30   shrugs 2#30x 2# 30x 30x 30x  2#x30   Supine cane flex 2# 2/10 2# 2/10 x10 2/10 2# 2/10   UBE  6m  L3 7 min L3 4 min L3 6m   L3 6 min L3   Pulleys  30x 3 min  2 min 3 m  3 min   Strap stretch IR 10x 10" -->   x10 10"    Cane abd, ext, IR 10x 10x  x10  x10   Rhythmic stab supine 1m 1 min    1 min   T-band row L2  2/10 L2 2/10   L2 2/10   T-band ANNE L2  2/10 L2 2/10   L2 2/10   T-band ER L2  2/10 L2 2/10   L2 2/10   T-band IR L2  2/10 L2 2/10   L2 2/10   T-band bicep  L2 2/10 L2 2/10 L2 2/10 L2 2/10 L2 2/10   T-band tricep L2 2/10 L2 2/10 L2 2/10 L2 2/10 L2 2/10   Wall slides 2/10 2/10 2/10 2/10 Wall slide x10   AROM flex/abd 10x 10x  x10 10x AROM x10   Standing cane flex 10x 10x  x10ea 10x ea x10   SL ER, and abd 1#  2/10 1# 2/10   1# 2/10   Bodyblade 2pos 30"x2 30" x2              Modalities        CP 15 15 15 15 15   IFC R shoulder 15 15 15 15 15

## 2020-03-11 ENCOUNTER — OFFICE VISIT (OUTPATIENT)
Dept: PHYSICAL THERAPY | Facility: CLINIC | Age: 70
End: 2020-03-11
Payer: MEDICARE

## 2020-03-11 DIAGNOSIS — Z98.890 S/P ARTHROSCOPY OF RIGHT SHOULDER: ICD-10-CM

## 2020-03-11 DIAGNOSIS — M25.511 ACUTE PAIN OF RIGHT SHOULDER: ICD-10-CM

## 2020-03-11 DIAGNOSIS — M25.811 OTHER SPECIFIED JOINT DISORDERS, RIGHT SHOULDER: Primary | ICD-10-CM

## 2020-03-11 PROCEDURE — 97112 NEUROMUSCULAR REEDUCATION: CPT | Performed by: PHYSICAL THERAPIST

## 2020-03-11 PROCEDURE — 97110 THERAPEUTIC EXERCISES: CPT | Performed by: PHYSICAL THERAPIST

## 2020-03-11 PROCEDURE — 97140 MANUAL THERAPY 1/> REGIONS: CPT | Performed by: PHYSICAL THERAPIST

## 2020-03-11 PROCEDURE — 97014 ELECTRIC STIMULATION THERAPY: CPT | Performed by: PHYSICAL THERAPIST

## 2020-03-11 NOTE — PROGRESS NOTES
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    2  S/P arthroscopy of right shoulder Z98 890    3  Acute pain of right shoulder M25 511                   Subjective: Pt notes shoulder is pretty good this morning  Pain is intermittent rated 1/10  Objective: See treatment diary below  Progressed program per protocol  Increased to L3 theraband, PROM abd 158 ER 75 IR 58      Assessment: Tolerated treatment well  Patient with mild soreness deltoid area after TE and neuromuscular re-education  Pt with increased AROM into flex and abduction this date  Tolerated 1lb weight well and progression to L3 T-band  Pt making good progress toward all goals  Plan: Continue per plan of care  Precautions:  Follow protocol  Reassessment Due: 3/18/2020  Surgery Date: 2020        Date: 3/6 3/9 3/11 3/2 3/4   Visit # 16 17 18 14 15   Pain Level  2 1  5 2   Week post op   8  7   manual        PROM R shoulder ds  [employer] ds JH   Jot mobs R S' ds  [employer]  [employer]           Exercise Diary         pendulumns 2#30x  2# 30x D/C 30x 2#x30   shrugs 2#30x 2# 30x 2#30x 30x  2#x30   Supine cane flex 2# 2/10 2# 2/10 D/C 2/10 2# 2/10   UBE  6m  L3 7 min L3 8 min L3 6m   L3 6 min L3   Pulleys  30x 3 min  D/C 3 m  3 min   Strap stretch IR 10x 10" --> 10x 10"  x10 10"    Cane abd, ext, IR 10x 10x  2# 2/10  x10   Rhythmic stab supine 1m 1 min  1 min  1 min   T-band row L2  2/10 L2 2/10 L3 2/10  L2 2/10   T-band ANNE L2  2/10 L2 2/10 L3 2/10  L2 2/10   T-band ER L2  2/10 L2 2/10 L3 2/10  L2 2/10   T-band IR L2  2/10 L2 2/10 L3 2/10  L2 2/10   T-band bicep  L2 2/10 L2 2/10 L3 2/10 L2 2/10 L2 2/10   T-band tricep L2 2/10 L2 2/10 L3 2/10 L2 2/10 L2 2/10   Wall slides 2/10 2/10 2/10 2/10 Wall slide x10   AROM flex/abd 10x 10x  1# 2/10 10x AROM x10   Standing cane flex 10x 10x  D/C 10x ea x10   SL ER, and abd 1#  2/10 1# 2/10 1# 2/10  1# 2/10   Bodyblade 2pos 30"x2 30" x2 30" x2     Prone flex, horiz abd  ------> 2/10     Modalities        CP 15 15 15 15 15   IFC R shoulder 15 15 15 15 15

## 2020-03-13 ENCOUNTER — OFFICE VISIT (OUTPATIENT)
Dept: PHYSICAL THERAPY | Facility: CLINIC | Age: 70
End: 2020-03-13
Payer: MEDICARE

## 2020-03-13 DIAGNOSIS — Z98.890 S/P ARTHROSCOPY OF RIGHT SHOULDER: ICD-10-CM

## 2020-03-13 DIAGNOSIS — M25.511 ACUTE PAIN OF RIGHT SHOULDER: ICD-10-CM

## 2020-03-13 DIAGNOSIS — M25.811 OTHER SPECIFIED JOINT DISORDERS, RIGHT SHOULDER: Primary | ICD-10-CM

## 2020-03-13 PROCEDURE — 97110 THERAPEUTIC EXERCISES: CPT

## 2020-03-13 PROCEDURE — 97140 MANUAL THERAPY 1/> REGIONS: CPT

## 2020-03-13 PROCEDURE — 97112 NEUROMUSCULAR REEDUCATION: CPT

## 2020-03-13 NOTE — PROGRESS NOTES
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    2  S/P arthroscopy of right shoulder Z98 890    3  Acute pain of right shoulder M25 511        Start Time: 0830  Stop Time: 0945  Total time in clinic (min): 75 minutes    Subjective:Pt notes mild muscle soreness after change in her program last visit  Objective: See treatment diary below      Assessment: Tolerated treatment well  Patient exhibited good technique with therapeutic exercises      Plan: Continue per plan of care  Precautions:  Follow protocol  Reassessment Due: 3/18/2020  Surgery Date: 2020        Date: 3/6 3/9 3/11 3/13 3/4   Visit # 16 17 18 14 15   Pain Level  2 1  2   Week post op   8 8 7   manual        PROM R shoulder ds  [employer]  JH   Jot mobs R S' ds  [employer]  [employer]           Exercise Diary         shrugs 2#30x 2# 30x 2#30x 30x  2#x30   UBE  6m  L3 7 min L3 8 min L3 8m   L4 6 min L3   Strap stretch IR 10x 10" --> 10x 10" 10x 10" x10 10"    Cane abd, ext, IR 10x 10x  2# 2/10 2#  2/10 x10   Rhythmic stab supine 1m 1 min  1 min 1m 1 min   T-band row L2  2/10 L2 2/10 L3 2/10 L3  2/10 L2 2/10   T-band ANNE L2  2/10 L2 2/10 L3 2/10 L3  2/10 L2 2/10   T-band ER L2  2/10 L2 2/10 L3 2/10 L3 2/10 L2 2/10   T-band IR L2  2/10 L2 2/10 L3 2/10 L3 2/10 L2 2/10   T-band bicep  L2 2/10 L2 2/10 L3 2/10 L3 2/10 L2 2/10   T-band tricep L2 2/10 L2 2/10 L3 2/10 L3 2/10 L2 2/10   Wall slides 2/10 2/10 2/10 2/10 Wall slide x10   AROM flex/abd 10x 10x  1# 2/10 1#  2/10 AROM x10   SL ER, and abd 1#  2/10 1# 2/10 1# 2/10 1#2/10 1# 2/10   Bodyblade 2pos 30"x2 30" x2 30" x2 30x 2x    Prone flex, horiz abd  ------> 2/10 2/10    Modalities        CP 15 15 15 15 15   IFC R shoulder 15 15 15 15 15

## 2020-03-16 ENCOUNTER — OFFICE VISIT (OUTPATIENT)
Dept: PHYSICAL THERAPY | Facility: CLINIC | Age: 70
End: 2020-03-16
Payer: MEDICARE

## 2020-03-16 ENCOUNTER — TRANSCRIBE ORDERS (OUTPATIENT)
Dept: PHYSICAL THERAPY | Facility: CLINIC | Age: 70
End: 2020-03-16

## 2020-03-16 DIAGNOSIS — Z98.890 S/P ARTHROSCOPY OF RIGHT SHOULDER: ICD-10-CM

## 2020-03-16 DIAGNOSIS — M25.811 OTHER SPECIFIED JOINT DISORDERS, RIGHT SHOULDER: Primary | ICD-10-CM

## 2020-03-16 DIAGNOSIS — M25.511 ACUTE PAIN OF RIGHT SHOULDER: ICD-10-CM

## 2020-03-16 PROCEDURE — 97112 NEUROMUSCULAR REEDUCATION: CPT | Performed by: PHYSICAL THERAPIST

## 2020-03-16 PROCEDURE — 97140 MANUAL THERAPY 1/> REGIONS: CPT | Performed by: PHYSICAL THERAPIST

## 2020-03-16 PROCEDURE — 97014 ELECTRIC STIMULATION THERAPY: CPT | Performed by: PHYSICAL THERAPIST

## 2020-03-16 PROCEDURE — 97110 THERAPEUTIC EXERCISES: CPT | Performed by: PHYSICAL THERAPIST

## 2020-03-16 NOTE — LETTER
2020    Annabella Silveira MD  [facility]    Patient: Christal Glass   YOB: 1950   Date of Visit: 3/16/2020     Encounter Diagnosis     ICD-10-CM    1  Other specified joint disorders, right shoulder M25 811    2  S/P arthroscopy of right shoulder Z98 890    3  Acute pain of right shoulder M25 511        Dear Dr Sarah Buckner: Thank you for your recent referral of Christal Glass  Please review the attached evaluation summary from Annalee's recent visit  Please verify that you agree with the plan of care by signing the attached order  If you have any questions or concerns, please do not hesitate to call  I sincerely appreciate the opportunity to share in the care of one of your patients and hope to have another opportunity to work with you in the near future  Sincerely,    Josué Magdaleno, PT      Referring Provider:      I certify that I have read the below Plan of Care and certify the need for these services furnished under this plan of treatment while under my care  Annabella Silveira MD  [facility]: [phone]          PT Re-Evaluation     Today's date: 3/16/2020  Patient name: Christal Glass  : 1950  MRN: [id number]  Referring provider: Nicho Castellano MD  Dx:   Encounter Diagnosis     ICD-10-CM    1  Other specified joint disorders, right shoulder M25 811    2  S/P arthroscopy of right shoulder Z98 890    3  Acute pain of right shoulder M25 511                   Assessment  Assessment details: Pt is a 81year old female s/p R shoulder arthroscopic surgery for rotator cuff repair, acromioplasty, labral debridement and coplaning of distal clavicle  Pt has been seen for 20 visits of Outpatient therapy with treatment consisting of PROM and joint mobilization R shoulder, AROM/AROM exercises postural correction and scapular stabilization exercises   Pt receives CP and IFC after TE and has been complaint with HEP  Pt continues to make good progress with therapy with continued improvement in ROM in all planes and improvement in strength  Pt with improvement in ability to reach to and above shoulder level with improved lifting tolerance to 5 lbs  Pt still with limitation in donning bra and activity requiring reaching behind back  Pt still wakes nightly due to pain and soreness  Pt is making good progress toward all goals  Pt is in need of continued activity in PT to address ROM deficits into abduction and IR, strength deficits throughout R UE and to improve functional use of R UE with goal of return to (I) LOF  Impairments: abnormal or restricted ROM, activity intolerance, impaired physical strength, lacks appropriate home exercise program and pain with function  Functional limitations: reaching behind back, lifting to and above shoulder level  sleep toleranceUnderstanding of Dx/Px/POC: good   Prognosis: good    Goals  STG's to be met in 3-4 weeks  1  Decrease R shoulder pain to 3-4/10 at rest- met  2  Improve PROM R shoulder to flex at least 120, ER 70 degrees IR to 35 degrees per protocol- met  3  Pt will be able to don and doff sling Independently- met  4  Pt will be (I) with HEP- met  5  Pt will be able to sleep through night without waking- not met    LTG's to be met in 10-12 weeks:  1  Decrease R shoulder pain with activity/movement to 0-2/10- met   2  Improve R shoulder AROM to [facility] all motions needed for ADL's- progressing not met  3  Pt will demonstrate R shoulder strength within 3-5lbs of L- not met  4  Pt will perform all ADL's and dressing without modification of technique- partially met, except donning bra  5  Pt will be able to reach to and above shoulder level without difficulty- partially met  6  Pt will be able to lift 10lbs to or above shoulder level- progressing not met  7  Pt will return to driving- met  8   Improve Foto score to at least 55- met 61        Plan  Patient would benefit from: skilled physical therapy  Planned modality interventions: cryotherapy and unattended electrical stimulation  Other planned modality interventions: modalities to be added or modified at discretion of therapist  Planned therapy interventions: joint mobilization, manual therapy, neuromuscular re-education, patient education, postural training, therapeutic exercise, stretching, home exercise program and self care  Frequency: 3x week  Duration in weeks: 4  Plan of Care beginning date: 3/16/2020  Plan of Care expiration date: 4/15/2020  Treatment plan discussed with: patient and PTA        Subjective Evaluation    History of Present Illness  Date of onset: 2019  Date of surgery: 2020  Mechanism of injury: Pt continues to note improvement in pain ROM and functional use of R UE  Pt notes she has returned to reaching to and above shoulder level  Pt notes dressing is becoming more normal but still has to modify donning bra  Pt states her limitation is lifting anything heavy and notes weakness  Not a recurrent problem   Quality of life: good    Pain  Current pain ratin  At best pain ratin  At worst pain ratin  Location: R shoulder  Quality: dull ache  Relieving factors: ice  Progression: improved    Social Support  Patient lives at: [place]      Treatments  Current treatment: physical therapy  Patient Goals  Patient goals for therapy: decreased pain, increased motion, increased strength, independence with ADLs/IADLs and return to sport/leisure activities  Patient goal: being able to dress myself- met but with modification for bra        Objective     Cervical/Thoracic Screen   Cervical range of motion within normal limits    Neurological Testing     Sensation     Shoulder   Left Shoulder   Intact: light touch    Right Shoulder   Intact: light touch    Additional Neurological Details  Sensation intact    Active Range of Motion     Right Shoulder Flexion: 128 degrees   Abduction: 150 degrees   External rotation 90°:  67 degrees  External rotation BTH: C7   Internal rotation 90°:  46 degrees   Internal rotation BTB: L1     Additional Active Range of Motion Details    Elbow flex: AROM WFL        Passive Range of Motion     Right Shoulder   Flexion: 160 degrees   Abduction: 171 degrees   External rotation 90°:  75 degrees   Internal rotation 90°:  53 degrees     Additional Passive Range of Motion Details  PROM assessed supine    Strength/Myotome Testing     Additional Strength Details  Shoulder                  R                 L  Flex                     17 lbs             21 lbs  Abd                     9 lbs            19 lbs  IR                       19 lbs            19 lbs  ER                      8 lbs               19 lbs    Elbow  Flex                     28  lbs            17 lbs  Ext                       21 lbs           22 lbs    General Comments:      Shoulder Comments       Foto Score 4- increased to 63  Still unable to do hair and perform self care with R UE- normal performance of ADL's except donning bra  Unable to perform reaching behind back or behind head- No difficulty reaching behind head, still limited with reaching behind back   Pt has returned to driving short distance- No difficulty with driving  Pt unable to use R UE for lifting or carrying - Improved lift and carry tolerance to 5-8lbs below shoulder level  Pt still sleeping in recliner and waking nightly with inability to lay on R side  - Pt still waking nightly due to dull ache at shoulder             Precautions:  Follow protocol  Reassessment Due: 3/18/2020  Surgery Date: 1/21/2020        Date: 3/6 3/9 3/11 3/13 3/16   Visit # 16 17 18 19 20   Pain Level  2 1  0   Week post op   8 8 9   manual        PROM R shoulder ds [facility] gin SHEPARD   Jot mobs R S' ds [facility] [place] [employer] gin [facility]           Exercise Diary         shrugs 2#30x 2# 30x 2#30x 30x  2#x30   UBE  6m  L3 7 min L3 8 min L3 8m   L4 8 min L4 Strap stretch IR 10x 10" --> 10x 10" 10x 10" x10 10"    Cane abd, ext, IR 10x 10x  2# 2/10 2#  2/10 2# 2/10   Rhythmic stab supine 1m 1 min  1 min 1m 1 min   T-band row L2  2/10 L2 2/10 L3 2/10 L3  2/10 L3 2/10   T-band ANNE L2  2/10 L2 2/10 L3 2/10 L3  2/10 L3 2/10   T-band ER L2  2/10 L2 2/10 L3 2/10 L3 2/10 L3 2/10   T-band IR L2  2/10 L2 2/10 L3 2/10 L3 2/10 L3 2/10   T-band bicep  L2 2/10 L2 2/10 L3 2/10 L3 2/10 L3 2/10   T-band tricep L2 2/10 L2 2/10 L3 2/10 L3 2/10 L3 2/10   Wall slides 2/10 2/10 2/10 2/10 finger lad x20   AROM flex/abd 10x 10x  1# 2/10 1#  2/10 2# 2/10   SL ER, and abd 1#  2/10 1# 2/10 1# 2/10 1#2/10 2# 2/10   Bodyblade 2pos 30"x2 30" x2 30" x2 30x 2x 30" x2   Prone flex, horiz abd  ------> 2/10 2/10 2/10   Modalities        CP 15 15 15 15 15   IFC R shoulder 15 15 15 15 15

## 2020-03-16 NOTE — PROGRESS NOTES
PT Re-Evaluation     Today's date: 3/16/2020  Patient name: Sera Loo  : 1950  MRN: [id number]  Referring provider: Padilla Kwok MD  Dx:   Encounter Diagnosis     ICD-10-CM    1  Other specified joint disorders, right shoulder M25 811    2  S/P arthroscopy of right shoulder Z98 890    3  Acute pain of right shoulder M25 511                   Assessment  Assessment details: Pt is a 81year old female s/p R shoulder arthroscopic surgery for rotator cuff repair, acromioplasty, labral debridement and coplaning of distal clavicle  Pt has been seen for 20 visits of Outpatient therapy with treatment consisting of PROM and joint mobilization R shoulder, AROM/AROM exercises postural correction and scapular stabilization exercises  Pt receives CP and IFC after TE and has been complaint with HEP  Pt continues to make good progress with therapy with continued improvement in ROM in all planes and improvement in strength  Pt with improvement in ability to reach to and above shoulder level with improved lifting tolerance to 5 lbs  Pt still with limitation in donning bra and activity requiring reaching behind back  Pt still wakes nightly due to pain and soreness  Pt is making good progress toward all goals  Pt is in need of continued activity in PT to address ROM deficits into abduction and IR, strength deficits throughout R UE and to improve functional use of R UE with goal of return to (I) LOF  Impairments: abnormal or restricted ROM, activity intolerance, impaired physical strength, lacks appropriate home exercise program and pain with function  Functional limitations: reaching behind back, lifting to and above shoulder level  sleep toleranceUnderstanding of Dx/Px/POC: good   Prognosis: good    Goals  STG's to be met in 3-4 weeks  1  Decrease R shoulder pain to 3-4/10 at rest- met  2  Improve PROM R shoulder to flex at least 120, ER 70 degrees IR to 35 degrees per protocol- met  3   Pt will be able to don and doff sling Independently- met  4  Pt will be (I) with HEP- met  5  Pt will be able to sleep through night without waking- not met    LTG's to be met in 10-12 weeks:  1  Decrease R shoulder pain with activity/movement to 0-2/10- met   2  Improve R shoulder AROM to [facility] all motions needed for ADL's- progressing not met  3  Pt will demonstrate R shoulder strength within 3-5lbs of L- not met  4  Pt will perform all ADL's and dressing without modification of technique- partially met, except donning bra  5  Pt will be able to reach to and above shoulder level without difficulty- partially met  6  Pt will be able to lift 10lbs to or above shoulder level- progressing not met  7  Pt will return to driving- met  8  Improve Foto score to at least 55- met 61        Plan  Patient would benefit from: skilled physical therapy  Planned modality interventions: cryotherapy and unattended electrical stimulation  Other planned modality interventions: modalities to be added or modified at discretion of therapist  Planned therapy interventions: joint mobilization, manual therapy, neuromuscular re-education, patient education, postural training, therapeutic exercise, stretching, home exercise program and self care  Frequency: 3x week  Duration in weeks: 4  Plan of Care beginning date: 3/16/2020  Plan of Care expiration date: 4/15/2020  Treatment plan discussed with: patient and PTA        Subjective Evaluation    History of Present Illness  Date of onset: 2019  Date of surgery: 2020  Mechanism of injury: Pt continues to note improvement in pain ROM and functional use of R UE  Pt notes she has returned to reaching to and above shoulder level  Pt notes dressing is becoming more normal but still has to modify donning bra  Pt states her limitation is lifting anything heavy and notes weakness             Not a recurrent problem   Quality of life: good    Pain  Current pain ratin  At best pain ratin  At worst pain ratin  Location: R shoulder  Quality: dull ache  Relieving factors: ice  Progression: improved    Social Support  Patient lives at: [place]      Treatments  Current treatment: physical therapy  Patient Goals  Patient goals for therapy: decreased pain, increased motion, increased strength, independence with ADLs/IADLs and return to sport/leisure activities  Patient goal: being able to dress myself- met but with modification for bra        Objective     Cervical/Thoracic Screen   Cervical range of motion within normal limits    Neurological Testing     Sensation     Shoulder   Left Shoulder   Intact: light touch    Right Shoulder   Intact: light touch    Additional Neurological Details  Sensation intact    Active Range of Motion     Right Shoulder   Flexion: 128 degrees   Abduction: 150 degrees   External rotation 90°: 67 degrees  External rotation BTH: C7   Internal rotation 90°: 46 degrees   Internal rotation BTB: L1     Additional Active Range of Motion Details    Elbow flex: AROM WFL        Passive Range of Motion     Right Shoulder   Flexion: 160 degrees   Abduction: 171 degrees   External rotation 90°: 75 degrees   Internal rotation 90°: 53 degrees     Additional Passive Range of Motion Details  PROM assessed supine    Strength/Myotome Testing     Additional Strength Details  Shoulder                  R                 L  Flex                     17 lbs             21 lbs  Abd                     9 lbs            19 lbs  IR                       19 lbs            19 lbs  ER                      8 lbs               19 lbs    Elbow  Flex                     28  lbs            17 lbs  Ext                       21 lbs           22 lbs    General Comments:      Shoulder Comments       Foto Score 4- increased to 63  Still unable to do hair and perform self care with R UE- normal performance of ADL's except donning bra  Unable to perform reaching behind back or behind head- No difficulty reaching behind head, still limited with reaching behind back   Pt has returned to driving short distance- No difficulty with driving  Pt unable to use R UE for lifting or carrying - Improved lift and carry tolerance to 5-8lbs below shoulder level  Pt still sleeping in recliner and waking nightly with inability to lay on R side  - Pt still waking nightly due to dull ache at shoulder             Precautions:  Follow protocol  Reassessment Due: 3/18/2020  Surgery Date: 1/21/2020        Date: 3/6 3/9 3/11 3/13 3/16   Visit # 16 17 18 19 20   Pain Level  2 1  0   Week post op   8 8 9   manual        PROM R shoulder ds [facility] [employer] [facility] ds JH   Jot mobs R S' ds [facility] [employer] ds [employer]           Exercise Diary         shrugs 2#30x 2# 30x 2#30x 30x  2#x30   UBE  6m  L3 7 min L3 8 min L3 8m   L4 8 min L4   Strap stretch IR 10x 10" --> 10x 10" 10x 10" x10 10"    Cane abd, ext, IR 10x 10x  2# 2/10 2#  2/10 2# 2/10   Rhythmic stab supine 1m 1 min  1 min 1m 1 min   T-band row L2  2/10 L2 2/10 L3 2/10 L3  2/10 L3 2/10   T-band ANNE L2  2/10 L2 2/10 L3 2/10 L3  2/10 L3 2/10   T-band ER L2  2/10 L2 2/10 L3 2/10 L3 2/10 L3 2/10   T-band IR L2  2/10 L2 2/10 L3 2/10 L3 2/10 L3 2/10   T-band bicep  L2 2/10 L2 2/10 L3 2/10 L3 2/10 L3 2/10   T-band tricep L2 2/10 L2 2/10 L3 2/10 L3 2/10 L3 2/10   Wall slides 2/10 2/10 2/10 2/10 finger lad x20   AROM flex/abd 10x 10x  1# 2/10 1#  2/10 2# 2/10   SL ER, and abd 1#  2/10 1# 2/10 1# 2/10 1#2/10 2# 2/10   Bodyblade 2pos 30"x2 30" x2 30" x2 30x 2x 30" x2   Prone flex, horiz abd  ------> 2/10 2/10 2/10   Modalities        CP 15 15 15 15 15   IFC R shoulder 15 15 15 15 15

## 2020-03-18 ENCOUNTER — OFFICE VISIT (OUTPATIENT)
Dept: PHYSICAL THERAPY | Facility: CLINIC | Age: 70
End: 2020-03-18
Payer: MEDICARE

## 2020-03-18 DIAGNOSIS — M25.511 ACUTE PAIN OF RIGHT SHOULDER: ICD-10-CM

## 2020-03-18 DIAGNOSIS — M25.811 OTHER SPECIFIED JOINT DISORDERS, RIGHT SHOULDER: Primary | ICD-10-CM

## 2020-03-18 DIAGNOSIS — Z98.890 S/P ARTHROSCOPY OF RIGHT SHOULDER: ICD-10-CM

## 2020-03-18 PROCEDURE — 97140 MANUAL THERAPY 1/> REGIONS: CPT

## 2020-03-18 PROCEDURE — 97010 HOT OR COLD PACKS THERAPY: CPT

## 2020-03-18 PROCEDURE — 97014 ELECTRIC STIMULATION THERAPY: CPT

## 2020-03-18 PROCEDURE — 97110 THERAPEUTIC EXERCISES: CPT

## 2020-03-18 NOTE — PROGRESS NOTES
Daily Note     Today's date: 3/18/2020  Patient name: Kaela Mason  : 1950  MRN: [id number]  Referring provider: Corrina Graff MD  Dx:   Encounter Diagnosis     ICD-10-CM    1  Other specified joint disorders, right shoulder M25 811    2  S/P arthroscopy of right shoulder Z98 890    3  Acute pain of right shoulder M25 511        Start Time: 0830  Stop Time: 930  Total time in clinic (min): 60 minutes    Subjective: Patient had seen MD and was pleased with patient's progress  She was instructed continue PT and had given HEP for  IR stretching  Objective: PTA directed patient in TE program  IFC/CP applied post treat,  See treatment diary below  Assessment: Tolerated treatment well  Patient exhibited good technique with therapeutic exercises, discomfort noted at end range with TE , however was able to complete  Plan: Continue per plan of care  Precautions:  Follow protocol  Reassessment Due: 3/18/2020  Surgery Date: 2020        Date: 3/18 3/9 3/11 3/13 3/16   Visit # 21 17 18 19 20   Pain Level  2 1  0   Week post op   8 8 9   manual        PROM R shoulder Joel Nate [facility] RANDI ds DREA Bellamy'  KW [facility] RANDI ds [facility]           Exercise Diary         shrugs 2# 30x 2# 30x 2#30x 30x  2#x30   UBE  8m  L4 7 min L3 8 min L3 8m   L4 8 min L4   Strap stretch IR x10 10" --> 10x 10" 10x 10" x10 10"    Cane abd, ext, IR 2# 2/10 10x  2# 2/10 2#  2/10 2# 2/10   Rhythmic stab supine 1 min  1 min  1 min 1m 1 min   T-band row L3 2/10 L2 2/10 L3 2/10 L3  2/10 L3 2/10   T-band ANNE L3 2/10 L2 2/10 L3 2/10 L3  2/10 L3 2/10   T-band ER L3 2/10 L2 2/10 L3 2/10 L3 2/10 L3 2/10   T-band IR L3 2/10 L2 2/10 L3 2/10 L3 2/10 L3 2/10   T-band bicep  L3 2/10 L2 2/10 L3 2/10 L3 2/10 L3 2/10   T-band tricep L3 2/10 L2 2/10 L3 2/10 L3 2/10 L3 2/10   Wall slides Finger ladder  20x 2/10 2/10 2/10 finger lad x20   AROM flex/abd 2# 2/10 10x  1# 2/10 1#  2/10 2# 2/10   SL ER, and abd 2# 1# 2/10 1# 2/10 1#2/10 2# 2/10   Bodyblade 2pos 30" x2 30" x2 30" x2 30x 2x 30" x2   Prone flex, horiz abd 2/10 ------> 2/10 2/10 2/10   Modalities        CP 15 15 15 15 15   IFC R shoulder 15 15 15 15 15

## 2020-03-20 ENCOUNTER — OFFICE VISIT (OUTPATIENT)
Dept: PHYSICAL THERAPY | Facility: CLINIC | Age: 70
End: 2020-03-20
Payer: MEDICARE

## 2020-03-20 DIAGNOSIS — M25.811 OTHER SPECIFIED JOINT DISORDERS, RIGHT SHOULDER: Primary | ICD-10-CM

## 2020-03-20 DIAGNOSIS — Z98.890 S/P ARTHROSCOPY OF RIGHT SHOULDER: ICD-10-CM

## 2020-03-20 DIAGNOSIS — M25.511 ACUTE PAIN OF RIGHT SHOULDER: ICD-10-CM

## 2020-03-20 PROCEDURE — 97014 ELECTRIC STIMULATION THERAPY: CPT

## 2020-03-20 PROCEDURE — 97110 THERAPEUTIC EXERCISES: CPT

## 2020-03-20 PROCEDURE — 97140 MANUAL THERAPY 1/> REGIONS: CPT

## 2020-03-20 PROCEDURE — 97010 HOT OR COLD PACKS THERAPY: CPT

## 2020-03-20 NOTE — PROGRESS NOTES
Daily Note     Today's date: 3/20/2020  Patient name: Tiffanie Fuchs  : 1950  MRN: [id number]  Referring provider: Rubin Ritter MD  Dx:   Encounter Diagnosis     ICD-10-CM    1  Other specified joint disorders, right shoulder M25 811    2  S/P arthroscopy of right shoulder Z98 890    3  Acute pain of right shoulder M25 511        Start Time: 0830  Stop Time: 0930  Total time in clinic (min): 60 minutes    Subjective: Patient reported achyness in shoulder at this time, stating weather change is the cause  Objective: PTA directed patient in TE program, warm up on UBE  IFC/CP post treat, See treatment diary below  Assessment: Tolerated treatment well  Patient demonstrated fatigue with standing lateral raises more in R bicep, able to complete with rest breaks  Plan: Continue per plan of care  Precautions:  Follow protocol  Reassessment Due: 3/18/2020  Surgery Date: 2020        Date: 3/18 3/20 3/11 3/13 3/16   Visit # 21 22 18 19 20   Pain Level  0 1  0   Week post op   8 8 9   manual        PROM R shoulder Sammi Alli [facility] RANDI ds JH   Jot zachary Mihir Lesvia   99tests RANDI ds 99tests RANDI           Exercise Diary         shrugs 2# 30x 2# 30x 2#30x 30x  2#x30   UBE  8m  L4 8 min L4  8 min L3 8m   L4 8 min L4   Strap stretch IR x10 10" x10 10" 10x 10" 10x 10" x10 10"    Cane abd, ext, IR 2# 2/10 2# 2/10 2# 2/10 2#  2/10 2# 2/10   Rhythmic stab supine 1 min  1 min 1 min 1m 1 min   T-band row L3 2/10 L3 2/10 L3 2/10 L3  2/10 L3 2/10   T-band ANNE L3 2/10 L3 2/10 L3 2/10 L3  2/10 L3 2/10   T-band ER L3 2/10 L3  2/10 L3 2/10 L3 2/10 L3 2/10   T-band IR L3 2/10 L3 2/10 L3 2/10 L3 2/10 L3 2/10   T-band bicep  L3 2/10 L3 2/10 L3 2/10 L3 2/10 L3 2/10   T-band tricep L3 2/10 L3 2/10 L3 2/10 L3 2/10 L3 2/10   Wall slides Finger ladder  20x Finger ladder  20x 2/10 2/10 finger lad x20   AROM flex/abd 2# 2/10 2# 2/10 1# 2/10 1#  2/10 2# 2/10   SL ER, and abd 2# 2# 2/10 1# 2/10 1#2/10 2# 2/10   Bodyblade 2pos 30" x2 30"x2 30" x2 30x 2x 30" x2 Prone flex, horiz abd 2/10 2/10 2/10 2/10 2/10   Modalities        CP 15 15 15 15 15   IFC R shoulder 15 15 15 15 15

## 2020-03-23 ENCOUNTER — OFFICE VISIT (OUTPATIENT)
Dept: PHYSICAL THERAPY | Facility: CLINIC | Age: 70
End: 2020-03-23
Payer: MEDICARE

## 2020-03-23 DIAGNOSIS — Z98.890 S/P ARTHROSCOPY OF RIGHT SHOULDER: ICD-10-CM

## 2020-03-23 DIAGNOSIS — M25.811 OTHER SPECIFIED JOINT DISORDERS, RIGHT SHOULDER: Primary | ICD-10-CM

## 2020-03-23 DIAGNOSIS — M25.511 ACUTE PAIN OF RIGHT SHOULDER: ICD-10-CM

## 2020-03-23 PROCEDURE — 97110 THERAPEUTIC EXERCISES: CPT

## 2020-03-23 PROCEDURE — 97014 ELECTRIC STIMULATION THERAPY: CPT

## 2020-03-23 PROCEDURE — 97010 HOT OR COLD PACKS THERAPY: CPT

## 2020-03-23 PROCEDURE — 97140 MANUAL THERAPY 1/> REGIONS: CPT

## 2020-03-23 NOTE — PROGRESS NOTES
Daily Note     Today's date: 3/23/2020  Patient name: Delores Robertson  : 1950  MRN: [id number]  Referring provider: Madiha Victor MD  Dx:   Encounter Diagnosis     ICD-10-CM    1  Other specified joint disorders, right shoulder M25 811    2  S/P arthroscopy of right shoulder Z98 890    3  Acute pain of right shoulder M25 511        Start Time: 0830  Stop Time: 0930  Total time in clinic (min): 60 minutes    Subjective: Patient stated continues to have achiness in bicep, as a lump is present  She is able to cook and clean, however due to pain, is unable to run the vacuum  Objective: PTA directed patient in UE strengthening and ROM program, See treatment diary below  Assessment: Tolerated treatment well  Patient exhibited good technique with therapeutic exercises, some discomfort noted with T-band TE in R bicep  Plan: Continue per plan of care  Precautions:  Follow protocol  Reassessment Due: 3/18/2020  Surgery Date: 2020        Date: 3/18 3/20 3/23 3/13 3/16   Visit # 21 22 23 19 20   Pain Level  0 0  0   Week post op    8 9   manual        PROM R shoulder KW KW KW ds JH   Jot mobs R S'    ds [facility] RANDI           Exercise Diary         shrugs 2# 30x 2# 30x 2# 30x 30x  2#x30   UBE  8m  L4 8 min L4  8 min L4 8m   L4 8 min L4   Strap stretch IR x10 10" x10 10" 10x 10" 10x 10" x10 10"    Cane abd, ext, IR 2# 2/10 2# 2/10 2# 2/10 2#  2/10 2# 2/10   Rhythmic stab supine 1 min  1 min 1 min  1m 1 min   T-band row L3 2/10 L3 2/10 L3 2/10 L3  2/10 L3 2/10   T-band ANNE L3 2/10 L3 2/10 L3 2/10 L3  2/10 L3 2/10   T-band ER L3 2/10 L3  2/10 L3  2/10 L3 2/10 L3 2/10   T-band IR L3 2/10 L3 2/10 L3  2/10 L3 2/10 L3 2/10   T-band bicep  L3 2/10 L3 2/10 L3 2/10 L3 2/10 L3 2/10   T-band tricep L3 2/10 L3 2/10 L3 2/10 L3 2/10 L3 2/10   Wall slides Finger ladder  20x Finger ladder  20x Finger ladder 20x 2/10 finger lad x20   AROM flex/abd 2# 2/10 2# 2/10 2# 2/10 1#  2/10 2# 2/10   SL ER, and abd 2# 2# 2/10 2# 2/10 1#2/10 2# 2/10   Bodyblade 2pos 30" x2 30"x2 30" x 2 30x 2x 30" x2   Prone flex, horiz abd 2/10 2/10 2/10 2/10 2/10   Modalities        CP 15 15 15 15 15   IFC R shoulder 15 15 15 15 15

## 2020-03-25 ENCOUNTER — OFFICE VISIT (OUTPATIENT)
Dept: PHYSICAL THERAPY | Facility: CLINIC | Age: 70
End: 2020-03-25
Payer: MEDICARE

## 2020-03-25 DIAGNOSIS — Z98.890 S/P ARTHROSCOPY OF RIGHT SHOULDER: ICD-10-CM

## 2020-03-25 DIAGNOSIS — M25.811 OTHER SPECIFIED JOINT DISORDERS, RIGHT SHOULDER: Primary | ICD-10-CM

## 2020-03-25 DIAGNOSIS — M25.511 ACUTE PAIN OF RIGHT SHOULDER: ICD-10-CM

## 2020-03-25 PROCEDURE — 97014 ELECTRIC STIMULATION THERAPY: CPT | Performed by: PHYSICAL THERAPIST

## 2020-03-25 PROCEDURE — 97140 MANUAL THERAPY 1/> REGIONS: CPT | Performed by: PHYSICAL THERAPIST

## 2020-03-25 PROCEDURE — 97112 NEUROMUSCULAR REEDUCATION: CPT | Performed by: PHYSICAL THERAPIST

## 2020-03-25 PROCEDURE — 97110 THERAPEUTIC EXERCISES: CPT | Performed by: PHYSICAL THERAPIST

## 2020-03-25 NOTE — PROGRESS NOTES
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    2  S/P arthroscopy of right shoulder Z98 890    3  Acute pain of right shoulder M25 511                   Subjective: Pt notes her shoulder feels pretty good  Pt only notes bicep soreness rated 1/10  Objective: See treatment diary below  PROM nearly full into flex abduction and ER, most limitation into IR      Assessment: Tolerated treatment well  Patient with no complaint of increased pain with TE   Pt still noting mild soreness at end-range during stretching into flex and especially internal rotation  Pt making good progress toward all goals and is in need of continued Outpatient PT  Plan: Continue per plan of care  Precautions:  Follow protocol  Reassessment Due: 3/18/2020  Surgery Date: 2020        Date: 3/18 3/20 3/23 3/25 3/16   Visit # 21 22 23 24 20   Pain Level  0 0 1 0   Week post op        manual        PROM R shoulder [place] Patient [facility] RANDI   Jot mobs [facility]'    [facility] RANDI           Exercise Diary         shrugs 2# 30x 2# 30x 2# 30x 2#30x  2#x30   UBE  8m  L4 8 min L4  8 min L4 10m   L4 8 min L4   Strap stretch IR x10 10" x10 10" 10x 10" 10x 10" x10 10"    Cane abd, ext, IR 2# 2/10 2# 2/10 2# 2/10 2#  2/10 2# 2/10   Rhythmic stab supine 1 min  1 min 1 min  1m 1 min   T-band row L3 2/10 L3 2/10 L3 2/10 L3  3/10 L3 2/10   T-band ANNE L3 2/10 L3 2/10 L3 2/10 L3  3/10 L3 2/10   T-band ER L3 2/10 L3  2/10 L3  2/10 L3 3/10 L3 2/10   T-band IR L3 2/10 L3 2/10 L3  2/10 L3 3/10 L3 2/10   T-band bicep  L3 2/10 L3 2/10 L3 2/10 L3 2/10 L3 2/10   T-band tricep L3 2/10 L3 2/10 L3 2/10 L3 3/10 L3 2/10   Wall slides Finger ladder  20x Finger ladder  20x Finger ladder 20x 2/10 finger lad x20   AROM flex/abd 2# 2/10 2# 2/10 2# 2/10 2#  2/10 2# 2/10   SL ER, and abd 2# 2# 2/10 2# 2/10 1#2/10 2# 2/10   Bodyblade 2pos 30" x2 30"x2 30" x 2 30x 2x 30" x2   Prone flex, horiz abd 2/10 2/10 2/10 2/10 2/10   Modalities        CP 15 15 15 15 15   IFC R shoulder 15 15 15 15 15

## 2020-03-27 ENCOUNTER — OFFICE VISIT (OUTPATIENT)
Dept: PHYSICAL THERAPY | Facility: CLINIC | Age: 70
End: 2020-03-27
Payer: MEDICARE

## 2020-03-27 DIAGNOSIS — M25.811 OTHER SPECIFIED JOINT DISORDERS, RIGHT SHOULDER: Primary | ICD-10-CM

## 2020-03-27 DIAGNOSIS — Z98.890 S/P ARTHROSCOPY OF RIGHT SHOULDER: ICD-10-CM

## 2020-03-27 DIAGNOSIS — M25.511 ACUTE PAIN OF RIGHT SHOULDER: ICD-10-CM

## 2020-03-27 PROCEDURE — 97110 THERAPEUTIC EXERCISES: CPT | Performed by: PHYSICAL THERAPIST

## 2020-03-27 PROCEDURE — 97014 ELECTRIC STIMULATION THERAPY: CPT | Performed by: PHYSICAL THERAPIST

## 2020-03-27 PROCEDURE — 97140 MANUAL THERAPY 1/> REGIONS: CPT | Performed by: PHYSICAL THERAPIST

## 2020-03-27 PROCEDURE — 97112 NEUROMUSCULAR REEDUCATION: CPT | Performed by: PHYSICAL THERAPIST

## 2020-03-27 NOTE — PROGRESS NOTES
Daily Note     Today's date: 3/27/2020  Patient name: Wilfredo Mccarty  : 1950  MRN: [id number]  Referring provider: Manish Lazo MD  Dx:   Encounter Diagnosis     ICD-10-CM    1  Other specified joint disorders, right shoulder M25 811    2  S/P arthroscopy of right shoulder Z98 890    3  Acute pain of right shoulder M25 511                   Subjective: Pt denies pain and continues to note improvement in R shoulder  Objective: See treatment diary below  Pt continues to report functional improvement with all reaching lifting and ADL's  Pt still notes mild difficulty with reaching overhead getting heavy weight from top shelf  Assessment: Tolerated treatment well  Patient with no complaint of pain throughout TE  Pt still noting soreness at end range with stretching into Internal Rotation and ER  Full PROM into abduction  No pain noted after session  Plan: Continue per plan of care  Precautions:  Follow protocol  Reassessment Due: 3/18/2020  Surgery Date: 2020        Date: 3/18 3/20 3/23 3/25 3/27   Visit # 21 22 23 24 25   Pain Level  0 0 1 0   Week post op        manual        PROM R shoulder Katalina Locus [facility]   Jot mobs [facility]'    [facility]           Exercise Diary         shrugs 2# 30x 2# 30x 2# 30x 2#30x  3#x30   UBE  8m  L4 8 min L4  8 min L4 10m   L4 10 min L4   Strap stretch IR x10 10" x10 10" 10x 10" 10x 10" x10 10"    Cane abd, ext, IR 2# 2/10 2# 2/10 2# 2/10 2#  2/10 3# 2/10   Rhythmic stab supine 1 min  1 min 1 min  1m 1 min   T-band row L3 2/10 L3 2/10 L3 2/10 L3  3/10 L3 2/10   T-band ANNE L3 2/10 L3 2/10 L3 2/10 L3  3/10 L3 2/10   T-band ER L3 2/10 L3  2/10 L3  2/10 L3 3/10 L3 2/10   T-band IR L3 2/10 L3 2/10 L3  2/10 L3 3/10 L3 2/10   T-band bicep  L3 2/10 L3 2/10 L3 2/10 L3 2/10 L3 2/10   T-band tricep L3 2/10 L3 2/10 L3 2/10 L3 3/10 L3 2/10   Wall slides Finger ladder  20x Finger ladder  20x Finger ladder 20x 2/10 finger lad x20   AROM flex/abd 2# 2/10 2# 2/10 2# 2/10 2#  2/10 3# 2/10   SL ER, and abd 2# 2# 2/10 2# 2/10 1#2/10 2# 2/10   Bodyblade 2pos 30" x2 30"x2 30" x 2 30x 2x 30" x2   Prone flex, horiz abd 2/10 2/10 2/10 2/10 2/10   Modalities        CP 15 15 15 15 15   IFC R shoulder 15 15 15 15 15

## 2020-03-30 ENCOUNTER — OFFICE VISIT (OUTPATIENT)
Dept: PHYSICAL THERAPY | Facility: CLINIC | Age: 70
End: 2020-03-30
Payer: MEDICARE

## 2020-03-30 DIAGNOSIS — M25.811 OTHER SPECIFIED JOINT DISORDERS, RIGHT SHOULDER: Primary | ICD-10-CM

## 2020-03-30 DIAGNOSIS — Z98.890 S/P ARTHROSCOPY OF RIGHT SHOULDER: ICD-10-CM

## 2020-03-30 DIAGNOSIS — M25.511 ACUTE PAIN OF RIGHT SHOULDER: ICD-10-CM

## 2020-03-30 PROCEDURE — 97140 MANUAL THERAPY 1/> REGIONS: CPT

## 2020-03-30 PROCEDURE — 97112 NEUROMUSCULAR REEDUCATION: CPT

## 2020-03-30 PROCEDURE — 97014 ELECTRIC STIMULATION THERAPY: CPT

## 2020-03-30 PROCEDURE — 97110 THERAPEUTIC EXERCISES: CPT

## 2020-03-30 NOTE — PROGRESS NOTES
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    2  S/P arthroscopy of right shoulder Z98 890    3  Acute pain of right shoulder M25 511        Start Time: 0830  Stop Time: 0945  Total time in clinic (min): 75 minutes    Subjective: Pt reports only mild discomfort with her shoulder  She also notes less fatigue with her program       Objective: See treatment diary below  Progress Theraband exercises to L4  Foto score 80  Assessment: Tolerated treatment well  Patient exhibited good technique with therapeutic exercises      Plan: Continue per plan of care  Precautions:  Follow protocol  Reassessment Due: 3/18/2020  Surgery Date: 2020      Date: 3/30 3/20 3/23 3/25 3/27   Visit # 26 22 23 24 25   Pain Level 1 0 0 1 0   Week post op        manual        PROM R shoulder ds Mode [facility]   Jt mobs R S' ds   [facility]           Exercise Diary         shrugs 3# 30x 2# 30 2# 30x 2#30x  3#x30   UBE  10m  L4 8 min L4  8 min L4 10m   L4 10 m L4   Strap stretch IR x10 10" x10 10" 10x 10" 10x 10" x10 10"    Cane abd, ext, IR 3# 2/10 2# 2/10 2# 2/10 2#  2/10 3# 2/10   Rhythmic stab supine 1 min  1 min 1 min  1m 1 min   T-band row L4 2/10 L3 2/10 L3 2/10 L3  3/10 L3 2/10   T-band ANNE L4 2/10 L3 2/10 L3 2/10 L3  3/10 L3 2/10   T-band ER L4 2/10 L3  2/10 L3  2/10 L3 3/10 L3 2/10   T-band IR L4 2/10 L3 2/10 L3  2/10 L3 3/10 L3 2/10   T-band bicep  L4 2/10 L3 2/10 L3 2/10 L3 2/10 L3 2/10   T-band tricep L4 2/10 L3 2/10 L3 2/10 L3 3/10 L3 2/10   Finger ladder 20x Fingerladder  20x Finger ladder 20x 2/10 finger lad x20   AROM flex/abd 3# 2/10 2# 2/10 2# 210 2#  2/10 3# 2/10   SL ER, and abd 3#er  2#abd 2# 2/10 2# 2/10 1#2/10 2# 2/10   Bodyblade 2pos 30" x2 30"x2 30" x 2 30x 2x 30" x2   Prone flex, horiz abd 2/10 2/10 2/10 210 2/10 Modalities        CP 15 15 15 15 15   IFC R shoulder 15 15 15 15 15

## 2020-04-01 ENCOUNTER — OFFICE VISIT (OUTPATIENT)
Dept: PHYSICAL THERAPY | Facility: CLINIC | Age: 70
End: 2020-04-01
Payer: MEDICARE

## 2020-04-01 DIAGNOSIS — Z98.890 S/P ARTHROSCOPY OF RIGHT SHOULDER: ICD-10-CM

## 2020-04-01 DIAGNOSIS — M25.511 ACUTE PAIN OF RIGHT SHOULDER: ICD-10-CM

## 2020-04-01 DIAGNOSIS — M25.811 OTHER SPECIFIED JOINT DISORDERS, RIGHT SHOULDER: Primary | ICD-10-CM

## 2020-04-01 PROCEDURE — 97112 NEUROMUSCULAR REEDUCATION: CPT

## 2020-04-01 PROCEDURE — 97110 THERAPEUTIC EXERCISES: CPT

## 2020-04-01 PROCEDURE — 97014 ELECTRIC STIMULATION THERAPY: CPT

## 2020-04-01 PROCEDURE — 97140 MANUAL THERAPY 1/> REGIONS: CPT

## 2020-04-03 ENCOUNTER — OFFICE VISIT (OUTPATIENT)
Dept: PHYSICAL THERAPY | Facility: CLINIC | Age: 70
End: 2020-04-03
Payer: MEDICARE

## 2020-04-03 DIAGNOSIS — M25.811 OTHER SPECIFIED JOINT DISORDERS, RIGHT SHOULDER: Primary | ICD-10-CM

## 2020-04-03 DIAGNOSIS — Z98.890 S/P ARTHROSCOPY OF RIGHT SHOULDER: ICD-10-CM

## 2020-04-03 DIAGNOSIS — M25.511 ACUTE PAIN OF RIGHT SHOULDER: ICD-10-CM

## 2020-04-03 PROCEDURE — 97140 MANUAL THERAPY 1/> REGIONS: CPT | Performed by: PHYSICAL THERAPIST

## 2020-04-03 PROCEDURE — 97112 NEUROMUSCULAR REEDUCATION: CPT | Performed by: PHYSICAL THERAPIST

## 2020-04-03 PROCEDURE — 97110 THERAPEUTIC EXERCISES: CPT | Performed by: PHYSICAL THERAPIST

## 2020-04-06 ENCOUNTER — OFFICE VISIT (OUTPATIENT)
Dept: PHYSICAL THERAPY | Facility: CLINIC | Age: 70
End: 2020-04-06
Payer: MEDICARE

## 2020-04-06 DIAGNOSIS — M25.811 OTHER SPECIFIED JOINT DISORDERS, RIGHT SHOULDER: Primary | ICD-10-CM

## 2020-04-06 DIAGNOSIS — M25.511 ACUTE PAIN OF RIGHT SHOULDER: ICD-10-CM

## 2020-04-06 DIAGNOSIS — Z98.890 S/P ARTHROSCOPY OF RIGHT SHOULDER: ICD-10-CM

## 2020-04-06 PROCEDURE — 97110 THERAPEUTIC EXERCISES: CPT

## 2020-04-06 PROCEDURE — 97112 NEUROMUSCULAR REEDUCATION: CPT

## 2020-04-06 PROCEDURE — 97140 MANUAL THERAPY 1/> REGIONS: CPT

## 2020-04-08 ENCOUNTER — TRANSCRIBE ORDERS (OUTPATIENT)
Dept: PHYSICAL THERAPY | Facility: CLINIC | Age: 70
End: 2020-04-08

## 2020-04-08 ENCOUNTER — OFFICE VISIT (OUTPATIENT)
Dept: PHYSICAL THERAPY | Facility: CLINIC | Age: 70
End: 2020-04-08
Payer: MEDICARE

## 2020-04-08 DIAGNOSIS — M25.511 ACUTE PAIN OF RIGHT SHOULDER: ICD-10-CM

## 2020-04-08 DIAGNOSIS — Z98.890 S/P ARTHROSCOPY OF RIGHT SHOULDER: ICD-10-CM

## 2020-04-08 DIAGNOSIS — M25.811 OTHER SPECIFIED JOINT DISORDERS, RIGHT SHOULDER: Primary | ICD-10-CM

## 2020-04-08 PROCEDURE — 97112 NEUROMUSCULAR REEDUCATION: CPT | Performed by: PHYSICAL THERAPIST

## 2020-04-08 PROCEDURE — 97140 MANUAL THERAPY 1/> REGIONS: CPT | Performed by: PHYSICAL THERAPIST

## 2020-04-08 PROCEDURE — 97110 THERAPEUTIC EXERCISES: CPT | Performed by: PHYSICAL THERAPIST

## 2020-04-09 ENCOUNTER — TRANSCRIBE ORDERS (OUTPATIENT)
Dept: PHYSICAL THERAPY | Facility: CLINIC | Age: 70
End: 2020-04-09

## 2020-04-09 ENCOUNTER — OFFICE VISIT (OUTPATIENT)
Dept: PHYSICAL THERAPY | Facility: CLINIC | Age: 70
End: 2020-04-09
Payer: MEDICARE

## 2020-04-09 DIAGNOSIS — M25.811 OTHER SPECIFIED JOINT DISORDERS, RIGHT SHOULDER: Primary | ICD-10-CM

## 2020-04-09 DIAGNOSIS — M25.511 ACUTE PAIN OF RIGHT SHOULDER: ICD-10-CM

## 2020-04-09 DIAGNOSIS — Z98.890 S/P ARTHROSCOPY OF RIGHT SHOULDER: ICD-10-CM

## 2020-04-09 PROCEDURE — 97112 NEUROMUSCULAR REEDUCATION: CPT | Performed by: PHYSICAL THERAPIST

## 2020-04-09 PROCEDURE — 97110 THERAPEUTIC EXERCISES: CPT | Performed by: PHYSICAL THERAPIST

## 2020-04-09 PROCEDURE — 97140 MANUAL THERAPY 1/> REGIONS: CPT | Performed by: PHYSICAL THERAPIST

## 2020-04-10 ENCOUNTER — APPOINTMENT (OUTPATIENT)
Dept: PHYSICAL THERAPY | Facility: CLINIC | Age: 70
End: 2020-04-10
Payer: MEDICARE

## 2021-08-09 NOTE — PROGRESS NOTES
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    2  S/P arthroscopy of right shoulder Z98 890    3  Acute pain of right shoulder M25 511        Start Time: 0830  Stop Time: 0930  Total time in clinic (min): 60 minutes    Subjective: Patient reported having a "twinge" in her shoulder this morning, she feels is due to putting on her coat  She will D/C sling tomorrow, 2020 per MD instruction  Objective: PTA directed patient in TE program, advancing exercises per protocol, See treatment diary below  Assessment: Tolerated treatment well  Patient exhibited good technique with therapeutic exercises, no pain with added TE ; noted shoulder "feeling good" post table slides  Plan: Continue per plan of care  Precautions:  Follow protocol  Reassessment Due: 2020  Surgery Date: 2020        Date: 2/5 2/10 2/12 2/17 2/3   Visit # 6 7 8 9 5   Pain Level 0 0 1 1 1   Week post op 2 5       manual        PROM R shoulder [facility] RANDI KW ds KW KW   Jot mobs R S' [facility] RANDI  ds             Exercise Diary         pendulumns x30 30x 30x 30x 30x    shrugs x30 30x 30x 30x  30x   retraction x30 30x 30x 30x 30x   Elbow wrist ROM 2#x30 2# 30x 2# 30x 2# 30x 30x   Gripper/ball squeeze x30 30x 30x 30x 30x                   UBE     2m L1    Pulleys     2 min             T-band bicep     L1 2/10    T-band tricep    L1 2/10    Table slides    /10                            Modalities        CP 15  15     IFC R shoulder 15 15 15 15 15 Patient will improve ROM in L knee to WNL in 6 weeks in order to improve gait quality, functional mobility and overall safety.